# Patient Record
Sex: FEMALE | Race: WHITE | Employment: OTHER | URBAN - METROPOLITAN AREA
[De-identification: names, ages, dates, MRNs, and addresses within clinical notes are randomized per-mention and may not be internally consistent; named-entity substitution may affect disease eponyms.]

---

## 2017-04-16 ENCOUNTER — HOSPITAL ENCOUNTER (EMERGENCY)
Facility: HOSPITAL | Age: 57
Discharge: HOME | End: 2017-04-16
Attending: EMERGENCY MEDICINE
Payer: COMMERCIAL

## 2017-04-16 VITALS
TEMPERATURE: 99 F | SYSTOLIC BLOOD PRESSURE: 130 MMHG | OXYGEN SATURATION: 98 % | DIASTOLIC BLOOD PRESSURE: 88 MMHG | BODY MASS INDEX: 31.01 KG/M2 | HEART RATE: 78 BPM | RESPIRATION RATE: 14 BRPM | HEIGHT: 63 IN | WEIGHT: 175 LBS

## 2017-04-16 DIAGNOSIS — S61.219A FINGER LACERATION, INITIAL ENCOUNTER: Primary | ICD-10-CM

## 2017-04-16 PROCEDURE — 12001 RPR S/N/AX/GEN/TRNK 2.5CM/<: CPT

## 2017-04-16 PROCEDURE — 99282 EMERGENCY DEPT VISIT SF MDM: CPT

## 2017-04-16 PROCEDURE — 99283 EMERGENCY DEPT VISIT LOW MDM: CPT

## 2017-04-16 NOTE — ED PROVIDER NOTES
Patient Seen in: [facility] Emergency Department    History   Patient presents with:  Laceration Abrasion (integumentary)    Stated Complaint: finger lac    HPI    38-year-old white female who presents emergency room today for complaint of laceratio afebrile  Patient has a small 3 mm laceration on the extensor surface of the small finger of her left hand just distal to the MCP joint. Patient can completely flex and extend at the MCP PIP and DIP joint without difficulty.   She is neurovascular intact t

## 2018-04-23 ENCOUNTER — APPOINTMENT (OUTPATIENT)
Dept: CT IMAGING | Facility: HOSPITAL | Age: 58
End: 2018-04-23
Attending: FAMILY MEDICINE
Payer: COMMERCIAL

## 2018-04-23 ENCOUNTER — HOSPITAL ENCOUNTER (OUTPATIENT)
Age: 58
Discharge: HOME | End: 2018-04-23
Attending: FAMILY MEDICINE
Payer: COMMERCIAL

## 2018-04-23 VITALS
HEART RATE: 98 BPM | DIASTOLIC BLOOD PRESSURE: 89 MMHG | TEMPERATURE: 98 F | RESPIRATION RATE: 16 BRPM | HEIGHT: 63 IN | BODY MASS INDEX: 31.18 KG/M2 | WEIGHT: 176 LBS | OXYGEN SATURATION: 97 % | SYSTOLIC BLOOD PRESSURE: 124 MMHG

## 2018-04-23 DIAGNOSIS — R19.7 DIARRHEA, UNSPECIFIED TYPE: ICD-10-CM

## 2018-04-23 DIAGNOSIS — K57.32 SIGMOID DIVERTICULITIS: Primary | ICD-10-CM

## 2018-04-23 DIAGNOSIS — R10.30 LOWER ABDOMINAL PAIN: ICD-10-CM

## 2018-04-23 PROCEDURE — 74177 CT ABD & PELVIS W/CONTRAST: CPT | Performed by: FAMILY MEDICINE

## 2018-04-23 PROCEDURE — 36415 COLL VENOUS BLD VENIPUNCTURE: CPT

## 2018-04-23 PROCEDURE — 81002 URINALYSIS NONAUTO W/O SCOPE: CPT | Performed by: FAMILY MEDICINE

## 2018-04-23 PROCEDURE — 80047 BASIC METABLC PNL IONIZED CA: CPT

## 2018-04-23 PROCEDURE — 99204 OFFICE O/P NEW MOD 45 MIN: CPT

## 2018-04-23 PROCEDURE — 99214 OFFICE O/P EST MOD 30 MIN: CPT

## 2018-04-23 PROCEDURE — 85025 COMPLETE CBC W/AUTO DIFF WBC: CPT | Performed by: FAMILY MEDICINE

## 2018-04-23 RX ORDER — CIPROFLOXACIN 500 MG/1
500 TABLET, FILM COATED ORAL 2 TIMES DAILY
Qty: 20 TABLET | Refills: 0 | Status: SHIPPED | OUTPATIENT
Start: 2018-04-23 | End: 2018-05-03

## 2018-04-23 RX ORDER — METRONIDAZOLE 500 MG/1
500 TABLET ORAL 3 TIMES DAILY
Qty: 30 TABLET | Refills: 0 | Status: SHIPPED | OUTPATIENT
Start: 2018-04-23 | End: 2018-05-03

## 2018-04-23 RX ORDER — MULTIVITAMIN
TABLET ORAL DAILY
COMMUNITY

## 2018-04-23 NOTE — ED PROVIDER NOTES
Patient Seen in: [facility]    History   Patient presents with:  Abdominal Pain  Change of Bowel Habits    Stated Complaint: abdominal pain x4 days    HPI    47-year-old female with a prior history of diverticulitis present for stated complaint: abdominal pain x4 days  Other systems are as noted in HPI. Constitutional and vital signs reviewed. All other systems reviewed and negative except as noted above.     Physical Exam   ED Triage Vitals [04/23/18 1348]  BP: 126/91 ISTAT Chloride 99 (*)     ISTAT Creatinine 1.10 (*)     ISTAT Glucose 101 (*)     All other components within normal limits     Ct Abdomen+pelvis(contrast Only)(cpt=74177)    Result Date: 4/23/2018  PROCEDURE:  CT ABDOMEN+PELVIS (CONTRAST ONLY) (CPT=74177) Nodular contour of the uterus which likely contains underlying fibroids. BONES:  Posttreatment changes of vertebral augmentation noted within lumbar spine. LUNG BASES:  No visible pulmonary or pleural disease. OTHER:  Negative.        CONCLUSION:  There ar

## 2018-04-23 NOTE — ED INITIAL ASSESSMENT (HPI)
The patient states over the last 4-6 days she has had low abdominal pain and a change in bowel movements. She states the lower abdomen is constantly tender but the pain gets worse with certain activities and with bowel movements.   She has had a change in

## 2019-08-04 ENCOUNTER — HOSPITAL ENCOUNTER (OUTPATIENT)
Age: 59
Discharge: HOME | End: 2019-08-04
Attending: FAMILY MEDICINE
Payer: COMMERCIAL

## 2019-08-04 ENCOUNTER — APPOINTMENT (OUTPATIENT)
Dept: GENERAL RADIOLOGY | Age: 59
End: 2019-08-04
Attending: FAMILY MEDICINE
Payer: COMMERCIAL

## 2019-08-04 VITALS
HEART RATE: 101 BPM | TEMPERATURE: 98 F | BODY MASS INDEX: 33.13 KG/M2 | DIASTOLIC BLOOD PRESSURE: 90 MMHG | OXYGEN SATURATION: 95 % | SYSTOLIC BLOOD PRESSURE: 112 MMHG | WEIGHT: 180 LBS | RESPIRATION RATE: 16 BRPM | HEIGHT: 62 IN

## 2019-08-04 DIAGNOSIS — H61.23 BILATERAL IMPACTED CERUMEN: ICD-10-CM

## 2019-08-04 DIAGNOSIS — J01.40 ACUTE NON-RECURRENT PANSINUSITIS: Primary | ICD-10-CM

## 2019-08-04 PROCEDURE — 99214 OFFICE O/P EST MOD 30 MIN: CPT

## 2019-08-04 PROCEDURE — 71046 X-RAY EXAM CHEST 2 VIEWS: CPT | Performed by: FAMILY MEDICINE

## 2019-08-04 PROCEDURE — 99213 OFFICE O/P EST LOW 20 MIN: CPT

## 2019-08-04 RX ORDER — AMOXICILLIN AND CLAVULANATE POTASSIUM 875; 125 MG/1; MG/1
1 TABLET, FILM COATED ORAL 2 TIMES DAILY
Qty: 14 TABLET | Refills: 0 | Status: SHIPPED | OUTPATIENT
Start: 2019-08-04 | End: 2019-08-11

## 2019-08-04 RX ORDER — FLUTICASONE PROPIONATE 50 MCG
2 SPRAY, SUSPENSION (ML) NASAL DAILY
Qty: 16 G | Refills: 0 | Status: SHIPPED | OUTPATIENT
Start: 2019-08-04 | End: 2019-09-03

## 2019-08-04 RX ORDER — BENZONATATE 100 MG/1
100 CAPSULE ORAL 3 TIMES DAILY PRN
Qty: 30 CAPSULE | Refills: 0 | Status: SHIPPED | OUTPATIENT
Start: 2019-08-04 | End: 2019-09-03

## 2019-08-04 RX ORDER — ATORVASTATIN CALCIUM 20 MG/1
20 TABLET, FILM COATED ORAL NIGHTLY
COMMUNITY
End: 2021-09-17

## 2019-08-04 NOTE — ED PROVIDER NOTES
Patient Seen in: [facility]    History   Patient presents with:  Cough/URI    Stated Complaint: upper respitory  x 3 weeks     HPI    64-year-old female with a history of hypertension, diverticulosis, and multiple sinus clair °C) (Temporal)   Resp 16   Ht 157.5 cm (5' 2\")   Wt 81.6 kg   SpO2 95%   BMI 32.92 kg/m²         Physical Exam    Gen: Pleasant female in NAD. Accompanied by   Head: Normocephalic atraumatic.   Lateral frontal, maxillary, and ethmoid sinus tenderne Oral Cap  Take 1 capsule (100 mg total) by mouth 3 (three) times daily as needed for cough., Normal, Disp-30 capsule, R-0    Fluticasone Propionate 50 MCG/ACT Nasal Suspension  2 sprays by Nasal route daily. , Normal, Disp-16 g, R-0

## 2019-08-04 NOTE — ED INITIAL ASSESSMENT (HPI)
C/o cold symptoms for 3 weeks, but last 2 days feeling run down, chills. Feeling chest and sinus congestion. Using Mucinex. Had some dizziness with illness, but none now.

## 2020-12-04 ENCOUNTER — APPOINTMENT (OUTPATIENT)
Dept: CT IMAGING | Facility: HOSPITAL | Age: 60
End: 2020-12-04
Attending: EMERGENCY MEDICINE
Payer: COMMERCIAL

## 2020-12-04 ENCOUNTER — HOSPITAL ENCOUNTER (EMERGENCY)
Facility: HOSPITAL | Age: 60
Discharge: HOME | End: 2020-12-04
Attending: EMERGENCY MEDICINE
Payer: COMMERCIAL

## 2020-12-04 ENCOUNTER — HOSPITAL ENCOUNTER (OUTPATIENT)
Age: 60
Discharge: ANOTHER HOSPITAL | End: 2020-12-04
Payer: COMMERCIAL

## 2020-12-04 VITALS
TEMPERATURE: 99 F | HEIGHT: 63 IN | DIASTOLIC BLOOD PRESSURE: 90 MMHG | RESPIRATION RATE: 18 BRPM | HEART RATE: 90 BPM | WEIGHT: 180 LBS | BODY MASS INDEX: 31.89 KG/M2 | SYSTOLIC BLOOD PRESSURE: 134 MMHG | OXYGEN SATURATION: 99 %

## 2020-12-04 VITALS
DIASTOLIC BLOOD PRESSURE: 88 MMHG | SYSTOLIC BLOOD PRESSURE: 141 MMHG | HEIGHT: 63 IN | RESPIRATION RATE: 18 BRPM | HEART RATE: 118 BPM | WEIGHT: 180 LBS | OXYGEN SATURATION: 95 % | BODY MASS INDEX: 31.89 KG/M2 | TEMPERATURE: 99 F

## 2020-12-04 DIAGNOSIS — R10.9 ABDOMINAL PAIN OF UNKNOWN ETIOLOGY: ICD-10-CM

## 2020-12-04 DIAGNOSIS — R10.9 ABDOMINAL PAIN, ACUTE: Primary | ICD-10-CM

## 2020-12-04 DIAGNOSIS — R11.0 NAUSEA: ICD-10-CM

## 2020-12-04 DIAGNOSIS — K57.92 ACUTE DIVERTICULITIS: Primary | ICD-10-CM

## 2020-12-04 PROCEDURE — 81003 URINALYSIS AUTO W/O SCOPE: CPT | Performed by: EMERGENCY MEDICINE

## 2020-12-04 PROCEDURE — 99215 OFFICE O/P EST HI 40 MIN: CPT | Performed by: NURSE PRACTITIONER

## 2020-12-04 PROCEDURE — 85025 COMPLETE CBC W/AUTO DIFF WBC: CPT | Performed by: EMERGENCY MEDICINE

## 2020-12-04 PROCEDURE — 96365 THER/PROPH/DIAG IV INF INIT: CPT

## 2020-12-04 PROCEDURE — 80053 COMPREHEN METABOLIC PANEL: CPT | Performed by: EMERGENCY MEDICINE

## 2020-12-04 PROCEDURE — 99285 EMERGENCY DEPT VISIT HI MDM: CPT

## 2020-12-04 PROCEDURE — 74176 CT ABD & PELVIS W/O CONTRAST: CPT | Performed by: EMERGENCY MEDICINE

## 2020-12-04 PROCEDURE — 99284 EMERGENCY DEPT VISIT MOD MDM: CPT

## 2020-12-04 PROCEDURE — 83690 ASSAY OF LIPASE: CPT | Performed by: EMERGENCY MEDICINE

## 2020-12-04 PROCEDURE — 96361 HYDRATE IV INFUSION ADD-ON: CPT

## 2020-12-04 RX ORDER — LEVOCETIRIZINE DIHYDROCHLORIDE 5 MG/1
5 TABLET, FILM COATED ORAL EVERY EVENING
COMMUNITY

## 2020-12-04 RX ORDER — LEVOFLOXACIN 500 MG/1
500 TABLET, FILM COATED ORAL DAILY
Qty: 10 TABLET | Refills: 0 | Status: SHIPPED | OUTPATIENT
Start: 2020-12-04 | End: 2020-12-14

## 2020-12-04 RX ORDER — METRONIDAZOLE 500 MG/1
500 TABLET ORAL 3 TIMES DAILY
Qty: 30 TABLET | Refills: 0 | Status: SHIPPED | OUTPATIENT
Start: 2020-12-04 | End: 2020-12-14

## 2020-12-04 RX ORDER — ONDANSETRON 4 MG/1
4 TABLET, ORALLY DISINTEGRATING ORAL EVERY 4 HOURS PRN
Qty: 10 TABLET | Refills: 0 | Status: SHIPPED | OUTPATIENT
Start: 2020-12-04 | End: 2020-12-11

## 2020-12-04 NOTE — ED INITIAL ASSESSMENT (HPI)
Pt c/o RLQ abd pain, Fever last night as high as 102.6 F, constipation and last BM x 2 days ago. Denies vomiting but does have nausea. Pt states she has had some mucous come out when trying to have a BM.

## 2020-12-04 NOTE — ED INITIAL ASSESSMENT (HPI)
Pt reports felt like Wednesday had some constipation and then states\"almost like an incarcerated bowel with mucous coming around the stool\". Having diffuse abdominal pain with intermittent stabbing to RLQ, fever of 102.6 tmax last night.        Hx of dive

## 2020-12-04 NOTE — ED PROVIDER NOTES
Patient Seen in: [facility] Emergency Department      History   Patient presents with:  Abdomen/Flank Pain  Fever    Stated Complaint: abd pain fever    HPI    The patient states that that on Wednesday she had some constipation and felt like she had respiratory distress. The patient is not septic or toxic    HEENT: Atraumatic, conjunctiva are not pale. There is no icterus. Oral mucosa Is wet. No facial trauma. The neck is supple. LUNGS: Clear to auscultation, there is no wheezing or retraction. was drawn.         [facility]      Ct Abdomen+pelvis(cpt=74176)    Result Date: 12/4/2020  PROCEDURE:  CT ABDOMEN+PELVIS (DZO=98698)  COMPARISON:  [facility] , CT, CT ABDOMEN+PELVIS(CONTRAST ONLY)(CPT=74177), 4/23/2018, 3:46 PM.  INDICATIONS:  abd pain fever  TECHNIQU care physician I discussed any point she can always return here if she has increasing pain or discomfort. She did not even want anything for pain even here and she felt comfortable going home. She does not look ill or toxic at this present time.

## 2020-12-04 NOTE — ED PROVIDER NOTES
Patient Seen in: [facility] [address] [place] [address]      History   Patient presents with:  Abdominal Pain    Stated Complaint: FEVER / ABDOMINAL DISCOMFORT    56-year-old female with a history of diverticulitis presents to immediate care with abdominal pain Current:/88   Pulse 118   Temp 98.8 °F (37.1 °C) (Temporal)   Resp 18   Ht 160 cm (5' 3\")   Wt 81.6 kg   SpO2 95%   BMI 31.89 kg/m²         Physical Exam  Vitals signs and nursing note reviewed.    Constitutional:       Appearance: Normal appea

## 2020-12-04 NOTE — ED NOTES
Pt ambulated to BR w/ steady gait. UA collected and sent to lab.  Awaiting lab results for CT and then further POC after results

## 2021-09-17 ENCOUNTER — OFFICE VISIT (OUTPATIENT)
Dept: INTERNAL MEDICINE CLINIC | Facility: CLINIC | Age: 61
End: 2021-09-17
Payer: COMMERCIAL

## 2021-09-17 ENCOUNTER — HOSPITAL ENCOUNTER (OUTPATIENT)
Dept: GENERAL RADIOLOGY | Age: 61
Discharge: HOME | End: 2021-09-17
Attending: NURSE PRACTITIONER
Payer: COMMERCIAL

## 2021-09-17 ENCOUNTER — LAB ENCOUNTER (OUTPATIENT)
Dept: LAB | Age: 61
End: 2021-09-17
Attending: NURSE PRACTITIONER
Payer: COMMERCIAL

## 2021-09-17 VITALS
TEMPERATURE: 99 F | HEIGHT: 63 IN | RESPIRATION RATE: 16 BRPM | OXYGEN SATURATION: 98 % | WEIGHT: 186 LBS | BODY MASS INDEX: 32.96 KG/M2 | DIASTOLIC BLOOD PRESSURE: 70 MMHG | HEART RATE: 94 BPM | SYSTOLIC BLOOD PRESSURE: 118 MMHG

## 2021-09-17 DIAGNOSIS — E78.2 MIXED HYPERLIPIDEMIA: ICD-10-CM

## 2021-09-17 DIAGNOSIS — Z00.00 LABORATORY EXAMINATION ORDERED AS PART OF A ROUTINE GENERAL MEDICAL EXAMINATION: ICD-10-CM

## 2021-09-17 DIAGNOSIS — I10 ESSENTIAL HYPERTENSION, BENIGN: ICD-10-CM

## 2021-09-17 DIAGNOSIS — M54.9 MID BACK PAIN, CHRONIC: ICD-10-CM

## 2021-09-17 DIAGNOSIS — G89.29 MID BACK PAIN, CHRONIC: ICD-10-CM

## 2021-09-17 DIAGNOSIS — Z12.31 ENCOUNTER FOR SCREENING MAMMOGRAM FOR MALIGNANT NEOPLASM OF BREAST: ICD-10-CM

## 2021-09-17 DIAGNOSIS — Z00.00 ANNUAL PHYSICAL EXAM: Primary | ICD-10-CM

## 2021-09-17 DIAGNOSIS — Z12.11 COLON CANCER SCREENING: ICD-10-CM

## 2021-09-17 DIAGNOSIS — Z23 NEED FOR INFLUENZA VACCINATION: ICD-10-CM

## 2021-09-17 DIAGNOSIS — Z87.440 HISTORY OF RECURRENT UTIS: ICD-10-CM

## 2021-09-17 LAB
ALBUMIN SERPL-MCNC: 4.1 G/DL (ref 3.4–5)
ALBUMIN/GLOB SERPL: 1.1 {RATIO} (ref 1–2)
ALP LIVER SERPL-CCNC: 89 U/L
ALT SERPL-CCNC: 54 U/L
ANION GAP SERPL CALC-SCNC: 7 MMOL/L (ref 0–18)
AST SERPL-CCNC: 31 U/L (ref 15–37)
BASOPHILS # BLD AUTO: 0.06 X10(3) UL (ref 0–0.2)
BASOPHILS NFR BLD AUTO: 0.9 %
BILIRUB SERPL-MCNC: 0.9 MG/DL (ref 0.1–2)
BILIRUB UR QL STRIP.AUTO: NEGATIVE
BUN BLD-MCNC: 13 MG/DL (ref 7–18)
CALCIUM BLD-MCNC: 9.4 MG/DL (ref 8.5–10.1)
CHLORIDE SERPL-SCNC: 105 MMOL/L (ref 98–112)
CHOLEST SERPL-MCNC: 168 MG/DL (ref ?–200)
CLARITY UR REFRACT.AUTO: CLEAR
CO2 SERPL-SCNC: 26 MMOL/L (ref 21–32)
CREAT BLD-MCNC: 1.01 MG/DL
EOSINOPHIL # BLD AUTO: 0 X10(3) UL (ref 0–0.7)
EOSINOPHIL NFR BLD AUTO: 0 %
ERYTHROCYTE [DISTWIDTH] IN BLOOD BY AUTOMATED COUNT: 12.9 %
EST. AVERAGE GLUCOSE BLD GHB EST-MCNC: 114 MG/DL (ref 68–126)
GLOBULIN PLAS-MCNC: 3.7 G/DL (ref 2.8–4.4)
GLUCOSE BLD-MCNC: 78 MG/DL (ref 70–99)
GLUCOSE UR STRIP.AUTO-MCNC: NEGATIVE MG/DL
HBA1C MFR BLD HPLC: 5.6 % (ref ?–5.7)
HCT VFR BLD AUTO: 45.2 %
HDLC SERPL-MCNC: 65 MG/DL (ref 40–59)
HGB BLD-MCNC: 14.8 G/DL
IMM GRANULOCYTES # BLD AUTO: 0.01 X10(3) UL (ref 0–1)
IMM GRANULOCYTES NFR BLD: 0.2 %
KETONES UR STRIP.AUTO-MCNC: NEGATIVE MG/DL
LDLC SERPL CALC-MCNC: 84 MG/DL (ref ?–100)
LEUKOCYTE ESTERASE UR QL STRIP.AUTO: NEGATIVE
LYMPHOCYTES # BLD AUTO: 1.92 X10(3) UL (ref 1–4)
LYMPHOCYTES NFR BLD AUTO: 29.4 %
MCH RBC QN AUTO: 31.6 PG (ref 26–34)
MCHC RBC AUTO-ENTMCNC: 32.7 G/DL (ref 31–37)
MCV RBC AUTO: 96.6 FL
MONOCYTES # BLD AUTO: 0.47 X10(3) UL (ref 0.1–1)
MONOCYTES NFR BLD AUTO: 7.2 %
NEUTROPHILS # BLD AUTO: 4.08 X10 (3) UL (ref 1.5–7.7)
NEUTROPHILS # BLD AUTO: 4.08 X10(3) UL (ref 1.5–7.7)
NEUTROPHILS NFR BLD AUTO: 62.3 %
NITRITE UR QL STRIP.AUTO: NEGATIVE
NONHDLC SERPL-MCNC: 103 MG/DL (ref ?–130)
OSMOLALITY SERPL CALC.SUM OF ELEC: 285 MOSM/KG (ref 275–295)
PATIENT FASTING Y/N/NP: YES
PATIENT FASTING Y/N/NP: YES
PH UR STRIP.AUTO: 6 [PH] (ref 5–8)
PLATELET # BLD AUTO: 235 10(3)UL (ref 150–450)
POTASSIUM SERPL-SCNC: 4.4 MMOL/L (ref 3.5–5.1)
PROT SERPL-MCNC: 7.8 G/DL (ref 6.4–8.2)
PROT UR STRIP.AUTO-MCNC: NEGATIVE MG/DL
RBC # BLD AUTO: 4.68 X10(6)UL
RBC UR QL AUTO: NEGATIVE
SODIUM SERPL-SCNC: 138 MMOL/L (ref 136–145)
SP GR UR STRIP.AUTO: 1 (ref 1–1.03)
TRIGL SERPL-MCNC: 104 MG/DL (ref 30–149)
TSI SER-ACNC: 1.49 MIU/ML (ref 0.36–3.74)
UROBILINOGEN UR STRIP.AUTO-MCNC: <2 MG/DL
VIT D+METAB SERPL-MCNC: 32.4 NG/ML (ref 30–100)
VLDLC SERPL CALC-MCNC: 16 MG/DL (ref 0–30)
WBC # BLD AUTO: 6.5 X10(3) UL (ref 4–11)

## 2021-09-17 PROCEDURE — 81003 URINALYSIS AUTO W/O SCOPE: CPT | Performed by: NURSE PRACTITIONER

## 2021-09-17 PROCEDURE — 90471 IMMUNIZATION ADMIN: CPT | Performed by: NURSE PRACTITIONER

## 2021-09-17 PROCEDURE — 3074F SYST BP LT 130 MM HG: CPT | Performed by: NURSE PRACTITIONER

## 2021-09-17 PROCEDURE — 99396 PREV VISIT EST AGE 40-64: CPT | Performed by: NURSE PRACTITIONER

## 2021-09-17 PROCEDURE — 3008F BODY MASS INDEX DOCD: CPT | Performed by: NURSE PRACTITIONER

## 2021-09-17 PROCEDURE — 3078F DIAST BP <80 MM HG: CPT | Performed by: NURSE PRACTITIONER

## 2021-09-17 PROCEDURE — 82306 VITAMIN D 25 HYDROXY: CPT | Performed by: NURSE PRACTITIONER

## 2021-09-17 PROCEDURE — 72072 X-RAY EXAM THORAC SPINE 3VWS: CPT | Performed by: NURSE PRACTITIONER

## 2021-09-17 PROCEDURE — 80061 LIPID PANEL: CPT | Performed by: NURSE PRACTITIONER

## 2021-09-17 PROCEDURE — 90686 IIV4 VACC NO PRSV 0.5 ML IM: CPT | Performed by: NURSE PRACTITIONER

## 2021-09-17 PROCEDURE — 83036 HEMOGLOBIN GLYCOSYLATED A1C: CPT | Performed by: NURSE PRACTITIONER

## 2021-09-17 PROCEDURE — 80050 GENERAL HEALTH PANEL: CPT | Performed by: NURSE PRACTITIONER

## 2021-09-17 RX ORDER — ATORVASTATIN CALCIUM 20 MG/1
20 TABLET, FILM COATED ORAL NIGHTLY
Qty: 90 TABLET | Refills: 3 | Status: SHIPPED | OUTPATIENT
Start: 2021-09-17

## 2021-09-17 RX ORDER — LISINOPRIL 10 MG/1
10 TABLET ORAL DAILY
Qty: 90 TABLET | Refills: 3 | Status: SHIPPED | OUTPATIENT
Start: 2021-09-17

## 2021-09-17 RX ORDER — SULFAMETHOXAZOLE AND TRIMETHOPRIM 400; 80 MG/1; MG/1
1 TABLET ORAL DAILY PRN
Qty: 30 TABLET | Refills: 0 | Status: SHIPPED | OUTPATIENT
Start: 2021-09-17

## 2021-09-17 RX ORDER — PHENOL 1.4 %
AEROSOL, SPRAY (ML) MUCOUS MEMBRANE
COMMUNITY

## 2021-09-17 NOTE — PROGRESS NOTES
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 She is coping well. Strong father history of breast cancer.  She is do for colonoscopy, hx of divertic Multiple Vitamins-Minerals (HAIR SKIN AND NAILS FORMULA OR), , Disp: , Rfl:     No current facility-administered medications on file prior to visit. Review of Systems   Constitutional: Negative for fever, chills and fatigue.    HENT: Negative for hea Lymphadenopathy: She has no cervical, supraclavicular, or axillary adenopathy. : deferred  Neurological: She is alert and oriented to person, place, and time. DTRs are +2 and symmetric. Cranial nerves grossly intact. Skin: Skin is warm.  No rash note Expiration Date: 9/12/2022      Discussed use of sunscreen, wearing seatbelt, recommend regular cardiovascular and weight bearing exercise as well as a well-rounded diet.     Her 5 year prevention plan includes: annual physical and labs, 30 minutes exercise

## 2021-09-17 NOTE — PATIENT INSTRUCTIONS

## 2021-09-20 ENCOUNTER — TELEPHONE (OUTPATIENT)
Dept: INTERNAL MEDICINE CLINIC | Facility: CLINIC | Age: 61
End: 2021-09-20

## 2021-09-20 DIAGNOSIS — Z13.820 OSTEOPOROSIS SCREENING: Primary | ICD-10-CM

## 2021-09-20 NOTE — TELEPHONE ENCOUNTER
----- Message from KVNG Escobedo sent at 9/18/2021  8:30 AM CDT -----  Results reviewed. Please inform patient no DM.  UA, vit d, CBC, TSH, Kidney, liver, and electrolytes WNL  ASCVD risk 4.2%, continue low fat diet with 150 min moderate intensity CV

## 2021-09-20 NOTE — TELEPHONE ENCOUNTER
----- Message from KVNG Armendariz sent at 9/18/2021  8:29 AM CDT -----  Results reviewed. Please inform patient no change since surgery.  No acute fracture but noted to have bone demineralization, recommend dexa scan to check for osteoporosis

## 2021-09-20 NOTE — TELEPHONE ENCOUNTER
Patient expresses understanding of lab/image  results and processes going forward.    Orders placed for f/u dexa

## 2021-10-04 ENCOUNTER — HOSPITAL ENCOUNTER (OUTPATIENT)
Dept: BONE DENSITY | Age: 61
Discharge: HOME | End: 2021-10-04
Attending: NURSE PRACTITIONER
Payer: COMMERCIAL

## 2021-10-04 ENCOUNTER — HOSPITAL ENCOUNTER (OUTPATIENT)
Dept: MAMMOGRAPHY | Age: 61
Discharge: HOME | End: 2021-10-04
Attending: NURSE PRACTITIONER
Payer: COMMERCIAL

## 2021-10-04 DIAGNOSIS — Z13.820 OSTEOPOROSIS SCREENING: ICD-10-CM

## 2021-10-04 DIAGNOSIS — Z12.31 ENCOUNTER FOR SCREENING MAMMOGRAM FOR MALIGNANT NEOPLASM OF BREAST: ICD-10-CM

## 2021-10-04 PROCEDURE — 77063 BREAST TOMOSYNTHESIS BI: CPT | Performed by: NURSE PRACTITIONER

## 2021-10-04 PROCEDURE — 77080 DXA BONE DENSITY AXIAL: CPT | Performed by: NURSE PRACTITIONER

## 2021-10-04 PROCEDURE — 77067 SCR MAMMO BI INCL CAD: CPT | Performed by: NURSE PRACTITIONER

## 2021-10-11 ENCOUNTER — HOSPITAL ENCOUNTER (OUTPATIENT)
Dept: MAMMOGRAPHY | Facility: HOSPITAL | Age: 61
Discharge: HOME | End: 2021-10-11
Attending: NURSE PRACTITIONER
Payer: COMMERCIAL

## 2021-10-11 DIAGNOSIS — R92.2 INCONCLUSIVE MAMMOGRAM: ICD-10-CM

## 2021-10-11 PROCEDURE — 77065 DX MAMMO INCL CAD UNI: CPT | Performed by: NURSE PRACTITIONER

## 2021-10-11 PROCEDURE — 77061 BREAST TOMOSYNTHESIS UNI: CPT | Performed by: NURSE PRACTITIONER

## 2021-10-12 ENCOUNTER — TELEPHONE (OUTPATIENT)
Dept: INTERNAL MEDICINE CLINIC | Facility: CLINIC | Age: 61
End: 2021-10-12

## 2021-10-12 DIAGNOSIS — R92.8 ABNORMAL MAMMOGRAM OF RIGHT BREAST: Primary | ICD-10-CM

## 2021-10-12 NOTE — TELEPHONE ENCOUNTER
Spoke with pt regarding results and recommendations. Pt does no feel comfortable waiting 6 months for f/u imaging as her sister has a h/o breast cancer. She would like to f/u with Dr. Kathryn Wade for consult. Ok per Dr. Berta Melissa to refer. Referral placed.  Pt awar

## 2021-10-12 NOTE — TELEPHONE ENCOUNTER
----- Message from Kavon Love MD sent at 10/11/2021  4:39 PM CDT -----  BI-RADS CATEGORY:     DIAGNOSTIC CATEGORY 3--PROBABLY BENIGN FINDING    RECOMMENDATIONS:     SHORT TERM FOLLOW-UP DIAGNOSTIC MAMMOGRAM RIGHT BREAST IN 6 MONTHS

## 2021-10-18 ENCOUNTER — LAB ENCOUNTER (OUTPATIENT)
Dept: LAB | Age: 61
End: 2021-10-18
Attending: INTERNAL MEDICINE
Payer: COMMERCIAL

## 2021-10-18 DIAGNOSIS — Z98.890 HISTORY OF COLONOSCOPY: ICD-10-CM

## 2021-10-21 PROBLEM — D12.0 BENIGN NEOPLASM OF CECUM: Status: ACTIVE | Noted: 2021-10-21

## 2021-10-21 PROBLEM — Z86.010 HX OF ADENOMATOUS COLONIC POLYPS: Status: ACTIVE | Noted: 2021-10-21

## 2021-10-21 PROBLEM — Z86.0101 HX OF ADENOMATOUS COLONIC POLYPS: Status: ACTIVE | Noted: 2021-10-21

## 2021-10-21 PROBLEM — Z86.0100 PERSONAL HISTORY OF COLONIC POLYPS: Status: ACTIVE | Noted: 2021-10-21

## 2021-10-21 PROBLEM — Z86.010 PERSONAL HISTORY OF COLONIC POLYPS: Status: ACTIVE | Noted: 2021-10-21

## 2022-02-04 ENCOUNTER — LAB ENCOUNTER (OUTPATIENT)
Dept: LAB | Age: 62
End: 2022-02-04
Attending: INTERNAL MEDICINE
Payer: COMMERCIAL

## 2022-02-04 ENCOUNTER — PATIENT MESSAGE (OUTPATIENT)
Dept: INTERNAL MEDICINE CLINIC | Facility: CLINIC | Age: 62
End: 2022-02-04

## 2022-02-04 DIAGNOSIS — Z11.52 ENCOUNTER FOR SCREENING FOR COVID-19: ICD-10-CM

## 2022-02-04 NOTE — TELEPHONE ENCOUNTER
From: Gerardo Pierre  To: KVNG Nagy  Sent: 2/4/2022 7:51 AM CST  Subject: Covid test    Soo Louise, yesterday afternoon I developed a mild headache and chills, no other apparent symptoms, my T-max was 101.8. I am fully vaccinated and boosted. I took an at home rapid test and it was negative. I would like to get a PCR test to confirm.  May I please have an order so I can schedule a test? Thank you, Aure Figueroa

## 2022-02-05 LAB — SARS-COV-2 RNA RESP QL NAA+PROBE: NOT DETECTED

## 2022-02-12 LAB — AMB EXT COVID-19 RESULT: NOT DETECTED

## 2022-02-14 ENCOUNTER — OFFICE VISIT (OUTPATIENT)
Dept: INTERNAL MEDICINE CLINIC | Facility: CLINIC | Age: 62
End: 2022-02-14
Payer: COMMERCIAL

## 2022-02-14 VITALS
SYSTOLIC BLOOD PRESSURE: 124 MMHG | OXYGEN SATURATION: 97 % | TEMPERATURE: 98 F | HEART RATE: 76 BPM | RESPIRATION RATE: 16 BRPM | WEIGHT: 185 LBS | BODY MASS INDEX: 32.78 KG/M2 | DIASTOLIC BLOOD PRESSURE: 82 MMHG | HEIGHT: 63 IN

## 2022-02-14 DIAGNOSIS — R19.8 ABDOMINAL GUARDING: ICD-10-CM

## 2022-02-14 DIAGNOSIS — K57.92 ACUTE DIVERTICULITIS: ICD-10-CM

## 2022-02-14 DIAGNOSIS — R10.32 LLQ PAIN: Primary | ICD-10-CM

## 2022-02-14 PROCEDURE — 99214 OFFICE O/P EST MOD 30 MIN: CPT | Performed by: INTERNAL MEDICINE

## 2022-02-14 PROCEDURE — 3074F SYST BP LT 130 MM HG: CPT | Performed by: INTERNAL MEDICINE

## 2022-02-14 PROCEDURE — 3079F DIAST BP 80-89 MM HG: CPT | Performed by: INTERNAL MEDICINE

## 2022-02-14 PROCEDURE — 3008F BODY MASS INDEX DOCD: CPT | Performed by: INTERNAL MEDICINE

## 2022-02-14 RX ORDER — LEVOFLOXACIN 500 MG/1
500 TABLET, FILM COATED ORAL DAILY
Qty: 10 TABLET | Refills: 0 | Status: SHIPPED | OUTPATIENT
Start: 2022-02-14 | End: 2022-02-24

## 2022-02-14 RX ORDER — PHENOL 1.4 %
600 AEROSOL, SPRAY (ML) MUCOUS MEMBRANE 2 TIMES DAILY
COMMUNITY

## 2022-02-14 RX ORDER — METRONIDAZOLE 500 MG/1
500 TABLET ORAL 3 TIMES DAILY
Qty: 30 TABLET | Refills: 0 | Status: SHIPPED | OUTPATIENT
Start: 2022-02-14 | End: 2022-03-28

## 2022-02-14 RX ORDER — ONDANSETRON 4 MG/1
4 TABLET, FILM COATED ORAL EVERY 8 HOURS PRN
Qty: 20 TABLET | Refills: 0 | Status: SHIPPED | OUTPATIENT
Start: 2022-02-14

## 2022-02-14 NOTE — TELEPHONE ENCOUNTER
Patient developed new onset GI pain and cramping of lower abdomen over the weekend with constant lower abdominal tenderness. Patient complains of nausea with no episodes of emesis. Patient has gas but is noticing bowel changes, which are small and have mucus. Patient has history of diverticulitis.  Office visit scheduled this AM for patient to be seen in office for eval.

## 2022-02-17 ENCOUNTER — HOSPITAL ENCOUNTER (OUTPATIENT)
Dept: CT IMAGING | Facility: HOSPITAL | Age: 62
Discharge: HOME | End: 2022-02-17
Attending: INTERNAL MEDICINE
Payer: COMMERCIAL

## 2022-02-17 DIAGNOSIS — R10.32 LLQ PAIN: ICD-10-CM

## 2022-02-17 DIAGNOSIS — R19.8 ABDOMINAL GUARDING: ICD-10-CM

## 2022-02-17 PROCEDURE — 74177 CT ABD & PELVIS W/CONTRAST: CPT | Performed by: INTERNAL MEDICINE

## 2022-02-17 PROCEDURE — 82565 ASSAY OF CREATININE: CPT

## 2022-02-17 RX ORDER — IOHEXOL 350 MG/ML
100 INJECTION, SOLUTION INTRAVENOUS
Status: COMPLETED | OUTPATIENT
Start: 2022-02-17 | End: 2022-02-17

## 2022-02-17 RX ADMIN — IOHEXOL 100 ML: 350 INJECTION, SOLUTION INTRAVENOUS at 16:34:00

## 2022-02-18 LAB — CREAT BLD-MCNC: 1 MG/DL

## 2022-03-14 ENCOUNTER — OFFICE VISIT (OUTPATIENT)
Dept: SURGERY | Facility: CLINIC | Age: 62
End: 2022-03-14
Payer: COMMERCIAL

## 2022-03-14 VITALS — TEMPERATURE: 97 F | HEART RATE: 87 BPM | SYSTOLIC BLOOD PRESSURE: 113 MMHG | DIASTOLIC BLOOD PRESSURE: 80 MMHG

## 2022-03-14 DIAGNOSIS — K57.92 DIVERTICULITIS: Primary | ICD-10-CM

## 2022-03-14 DIAGNOSIS — Z86.010 HX OF ADENOMATOUS COLONIC POLYPS: ICD-10-CM

## 2022-03-14 PROCEDURE — 99245 OFF/OP CONSLTJ NEW/EST HI 55: CPT | Performed by: COLON & RECTAL SURGERY

## 2022-03-14 PROCEDURE — 3074F SYST BP LT 130 MM HG: CPT | Performed by: COLON & RECTAL SURGERY

## 2022-03-14 PROCEDURE — 3079F DIAST BP 80-89 MM HG: CPT | Performed by: COLON & RECTAL SURGERY

## 2022-03-14 RX ORDER — NEOMYCIN SULFATE 500 MG/1
TABLET ORAL
Qty: 6 TABLET | Refills: 0 | Status: SHIPPED | OUTPATIENT
Start: 2022-03-14

## 2022-03-14 RX ORDER — POLYETHYLENE GLYCOL 3350, SODIUM CHLORIDE, SODIUM BICARBONATE, POTASSIUM CHLORIDE 420; 11.2; 5.72; 1.48 G/4L; G/4L; G/4L; G/4L
POWDER, FOR SOLUTION ORAL
Qty: 1 EACH | Refills: 0 | Status: SHIPPED | OUTPATIENT
Start: 2022-03-14

## 2022-03-14 RX ORDER — METRONIDAZOLE 500 MG/1
TABLET ORAL
Qty: 3 TABLET | Refills: 0 | Status: SHIPPED | OUTPATIENT
Start: 2022-03-14

## 2022-03-14 RX ORDER — AMOXICILLIN AND CLAVULANATE POTASSIUM 875; 125 MG/1; MG/1
1 TABLET, FILM COATED ORAL 2 TIMES DAILY
Qty: 20 TABLET | Refills: 0 | Status: SHIPPED | OUTPATIENT
Start: 2022-03-14 | End: 2022-03-24

## 2022-03-14 NOTE — PATIENT INSTRUCTIONS
I am seeing this patient in consultation from the primary care service and the gastroenterology group regarding multiple episodes of diverticulitis. Her last episode began on February 3, 2022. She took 10 days of antibiotics. She has had moderate relief, but not complete relief. Symptoms are still present at today's visit. She has had 4 different documented episodes of diverticulitis on CT scan in the past.    At this most recent episode her greatest symptoms were 5/10. It lasted several days at that level. She describes her pain in the both right and left lower quadrants. It was a combination of cramping, and intermittent stabbing pain. She did have a fever with this episode. She had nausea, but no vomiting. She has had constipation and bloating associated with her diverticulitis. She has no bright red blood per rectum or melena. She has no blood on the toilet paper. She has mucus in the stool, and narrowing of the stool at this time. She feels incomplete evacuation as well after bowel movement. The patient's last colonoscopy was in November 2021. She has both diverticulosis and history of prior polyps. Her endoscopy is being done by Dr. Noel Cheney. The patient has never been diagnosed with Crohn disease, ulcerative colitis, or irritable bowel syndrome. The patient has a family history of colon polyps in her father. No other first-degree or second-degree relatives have either colon cancer, or cancer of the uterus. She has not had any prior abdominal operations. The patient is not on any blood thinners. The patient has never had heart attack, stroke, heart valve replacement, heart murmur, or cardiac arrhythmia. I have personally reviewed the CT scan myself and provided my own interpretation. I have reviewed past CT scans as well. There is a phlegmon and inflammatory process in the sigmoid colon that dips slightly down into the pelvis.   It is immediately adjacent to the left ureter. On the most recent CT scan there is stranding. There is no evidence of free air or free fluid. Please see the radiologist findings for other incidental findings on the CT scan not related to the diverticulitis. Physical exam reveals her abdomen to be soft, tender in the left lower quadrant to deep palpation. No guarding or rebound. No masses. No ascites. No incisions. There are no inguinal or umbilical hernias present. Bowel sounds have normal activity and normal pitch. Liver and spleen are not palpable. She has persistent symptoms despite antibiotic therapy. There appears to be a smoldering diverticulitis at the site identified on CT scan. We will provide her with Augmentin 875 mg twice daily. This patient will ultimately require robotic low anterior resection the rectosigmoid colon for her multiply recurrent diverticulitis. I had a long conversation with the patient and her  regarding all the above. She is scheduled for operation on April 13, 2022, at [facility].    All risks, benefits, complications and alternatives to the proposed procedure(s) were fully discussed with the patient. All questions from the patient were answered in detail. A description of the procedure(s) and possible outcomes was fully discussed. The patient seemed to understand the conversation and its details. Consent for the procedure(s) was confirmed with the patient. During this visit, the Enhanced Recovery after Intestinal Surgery (ERAS) Patient Guide was discussed with Aurea Constantino. She was provided a copy of the guide to review at home, which includes education on the strategy, pre-op, intra-op and what to expect during the hospital stay for elective colorectal cases.

## 2022-04-01 ENCOUNTER — LABORATORY ENCOUNTER (OUTPATIENT)
Dept: LAB | Facility: HOSPITAL | Age: 62
End: 2022-04-01
Attending: COLON & RECTAL SURGERY
Payer: COMMERCIAL

## 2022-04-01 ENCOUNTER — TELEPHONE (OUTPATIENT)
Dept: SURGERY | Facility: CLINIC | Age: 62
End: 2022-04-01

## 2022-04-01 DIAGNOSIS — K57.92 DIVERTICULITIS: ICD-10-CM

## 2022-04-01 LAB
ANION GAP SERPL CALC-SCNC: 5 MMOL/L (ref 0–18)
ATRIAL RATE: 68 BPM
BUN BLD-MCNC: 15 MG/DL (ref 7–18)
CALCIUM BLD-MCNC: 9.8 MG/DL (ref 8.5–10.1)
CHLORIDE SERPL-SCNC: 108 MMOL/L (ref 98–112)
CO2 SERPL-SCNC: 29 MMOL/L (ref 21–32)
CREAT BLD-MCNC: 1.04 MG/DL
ERYTHROCYTE [DISTWIDTH] IN BLOOD BY AUTOMATED COUNT: 13 %
GLUCOSE BLD-MCNC: 86 MG/DL (ref 70–99)
HCT VFR BLD AUTO: 45.8 %
HGB BLD-MCNC: 14.7 G/DL
MCH RBC QN AUTO: 30.5 PG (ref 26–34)
MCHC RBC AUTO-ENTMCNC: 32.1 G/DL (ref 31–37)
MCV RBC AUTO: 95 FL
OSMOLALITY SERPL CALC.SUM OF ELEC: 294 MOSM/KG (ref 275–295)
P AXIS: 40 DEGREES
P-R INTERVAL: 222 MS
PLATELET # BLD AUTO: 274 10(3)UL (ref 150–450)
POTASSIUM SERPL-SCNC: 4.5 MMOL/L (ref 3.5–5.1)
Q-T INTERVAL: 388 MS
QRS DURATION: 80 MS
QTC CALCULATION (BEZET): 412 MS
R AXIS: 48 DEGREES
RBC # BLD AUTO: 4.82 X10(6)UL
SODIUM SERPL-SCNC: 142 MMOL/L (ref 136–145)
T AXIS: 39 DEGREES
VENTRICULAR RATE: 68 BPM
WBC # BLD AUTO: 6.9 X10(3) UL (ref 4–11)

## 2022-04-01 PROCEDURE — 93010 ELECTROCARDIOGRAM REPORT: CPT | Performed by: INTERNAL MEDICINE

## 2022-04-01 PROCEDURE — 36415 COLL VENOUS BLD VENIPUNCTURE: CPT

## 2022-04-01 PROCEDURE — 93005 ELECTROCARDIOGRAM TRACING: CPT

## 2022-04-01 PROCEDURE — 80048 BASIC METABOLIC PNL TOTAL CA: CPT

## 2022-04-01 PROCEDURE — 85027 COMPLETE CBC AUTOMATED: CPT

## 2022-04-04 ENCOUNTER — OFFICE VISIT (OUTPATIENT)
Dept: SURGERY | Facility: CLINIC | Age: 62
End: 2022-04-04
Payer: COMMERCIAL

## 2022-04-04 VITALS
BODY MASS INDEX: 32.78 KG/M2 | WEIGHT: 185 LBS | HEIGHT: 63 IN | DIASTOLIC BLOOD PRESSURE: 88 MMHG | SYSTOLIC BLOOD PRESSURE: 132 MMHG | TEMPERATURE: 98 F | HEART RATE: 76 BPM

## 2022-04-04 DIAGNOSIS — K57.92 DIVERTICULITIS: Primary | ICD-10-CM

## 2022-04-04 PROCEDURE — 3008F BODY MASS INDEX DOCD: CPT | Performed by: COLON & RECTAL SURGERY

## 2022-04-04 PROCEDURE — 3075F SYST BP GE 130 - 139MM HG: CPT | Performed by: COLON & RECTAL SURGERY

## 2022-04-04 PROCEDURE — 3079F DIAST BP 80-89 MM HG: CPT | Performed by: COLON & RECTAL SURGERY

## 2022-04-04 PROCEDURE — 99213 OFFICE O/P EST LOW 20 MIN: CPT | Performed by: COLON & RECTAL SURGERY

## 2022-04-04 NOTE — PATIENT INSTRUCTIONS
This patient resents for further care and treatment of her acute diverticulitis. She is on the surgery schedule for a robotic low anterior resection the rectosigmoid colon. She still has some pain in the lower abdomen. Her pain is actually right greater than left, however her tenderness is left greater than right. The patient states that she has pain in anticipation of her bowel movements. When the stool gets into the left colon, that is when she has the most symptoms. All of her symptoms are much better than the last time I saw her. She is having bowel movements that are soft and irregular. She has no bright red blood per rectum. She has no nausea or vomiting. She has completed her antibiotic therapy without complication. Clinical exam reveals her abdomen be soft, slightly tender to deep palpation in the left lower quadrant. She is minimally tender on the right side, however that is her point of maximal symptoms. Bowel sounds have normal activity normal pitch. There are no incisions on the abdomen. Liver and spleen are not palpable. BMI is 32.77. We had a long conversation regarding the upcoming operation. I also asked her to please call me if her current pain and symptoms advanced to any degree. If she does call back, we will put her on antibiotics up until the day of surgery. All risks, benefits, complications and alternatives to the proposed procedure(s) were fully discussed with the patient. All questions from the patient were answered in detail. A description of the procedure(s) and possible outcomes was fully discussed. The patient seemed to understand the conversation and its details. Consent for the procedure(s) was confirmed with the patient.

## 2022-04-11 ENCOUNTER — LAB ENCOUNTER (OUTPATIENT)
Dept: LAB | Age: 62
End: 2022-04-11
Attending: COLON & RECTAL SURGERY
Payer: COMMERCIAL

## 2022-04-11 DIAGNOSIS — K57.92 DIVERTICULITIS: ICD-10-CM

## 2022-04-12 ENCOUNTER — HOSPITAL ENCOUNTER (OUTPATIENT)
Dept: MAMMOGRAPHY | Facility: HOSPITAL | Age: 62
Discharge: HOME | End: 2022-04-12
Attending: INTERNAL MEDICINE
Payer: COMMERCIAL

## 2022-04-12 DIAGNOSIS — R92.8 ABNORMAL MAMMOGRAM OF RIGHT BREAST: ICD-10-CM

## 2022-04-12 LAB — SARS-COV-2 RNA RESP QL NAA+PROBE: NOT DETECTED

## 2022-04-12 PROCEDURE — 77065 DX MAMMO INCL CAD UNI: CPT | Performed by: INTERNAL MEDICINE

## 2022-04-12 PROCEDURE — 77061 BREAST TOMOSYNTHESIS UNI: CPT | Performed by: INTERNAL MEDICINE

## 2022-04-13 ENCOUNTER — ANESTHESIA EVENT (OUTPATIENT)
Dept: SURGERY | Facility: HOSPITAL | Age: 62
End: 2022-04-13
Payer: COMMERCIAL

## 2022-04-13 ENCOUNTER — ANESTHESIA (OUTPATIENT)
Dept: SURGERY | Facility: HOSPITAL | Age: 62
End: 2022-04-13
Payer: COMMERCIAL

## 2022-04-13 ENCOUNTER — HOSPITAL ENCOUNTER (INPATIENT)
Facility: HOSPITAL | Age: 62
LOS: 4 days | Discharge: HOME | End: 2022-04-17
Attending: COLON & RECTAL SURGERY | Admitting: COLON & RECTAL SURGERY
Payer: COMMERCIAL

## 2022-04-13 DIAGNOSIS — K57.92 DIVERTICULITIS: Primary | ICD-10-CM

## 2022-04-13 PROCEDURE — 0T788DZ DILATION OF BILATERAL URETERS WITH INTRALUMINAL DEVICE, VIA NATURAL OR ARTIFICIAL OPENING ENDOSCOPIC: ICD-10-PCS | Performed by: UROLOGY

## 2022-04-13 PROCEDURE — S0030 INJECTION, METRONIDAZOLE: HCPCS | Performed by: COLON & RECTAL SURGERY

## 2022-04-13 PROCEDURE — 0DTN0ZZ RESECTION OF SIGMOID COLON, OPEN APPROACH: ICD-10-PCS | Performed by: COLON & RECTAL SURGERY

## 2022-04-13 PROCEDURE — 8E0W4CZ ROBOTIC ASSISTED PROCEDURE OF TRUNK REGION, PERCUTANEOUS ENDOSCOPIC APPROACH: ICD-10-PCS | Performed by: COLON & RECTAL SURGERY

## 2022-04-13 PROCEDURE — 4A1685H MONITORING OF LYMPHATIC FLOW USING INDOCYANINE GREEN DYE, VIA NATURAL OR ARTIFICIAL OPENING ENDOSCOPIC: ICD-10-PCS | Performed by: UROLOGY

## 2022-04-13 PROCEDURE — 88307 TISSUE EXAM BY PATHOLOGIST: CPT | Performed by: COLON & RECTAL SURGERY

## 2022-04-13 RX ORDER — HYDROMORPHONE HYDROCHLORIDE 1 MG/ML
INJECTION, SOLUTION INTRAMUSCULAR; INTRAVENOUS; SUBCUTANEOUS
Status: COMPLETED
Start: 2022-04-13 | End: 2022-04-13

## 2022-04-13 RX ORDER — KETOROLAC TROMETHAMINE 30 MG/ML
30 INJECTION, SOLUTION INTRAMUSCULAR; INTRAVENOUS EVERY 8 HOURS
Status: COMPLETED | OUTPATIENT
Start: 2022-04-14 | End: 2022-04-15

## 2022-04-13 RX ORDER — LIDOCAINE HYDROCHLORIDE ANHYDROUS AND DEXTROSE MONOHYDRATE .8; 5 G/100ML; G/100ML
INJECTION, SOLUTION INTRAVENOUS CONTINUOUS PRN
Status: DISCONTINUED | OUTPATIENT
Start: 2022-04-13 | End: 2022-04-13 | Stop reason: SURG

## 2022-04-13 RX ORDER — HYDROMORPHONE HYDROCHLORIDE 1 MG/ML
0.4 INJECTION, SOLUTION INTRAMUSCULAR; INTRAVENOUS; SUBCUTANEOUS EVERY 5 MIN PRN
Status: DISCONTINUED | OUTPATIENT
Start: 2022-04-13 | End: 2022-04-13 | Stop reason: HOSPADM

## 2022-04-13 RX ORDER — MIDAZOLAM HYDROCHLORIDE 1 MG/ML
INJECTION INTRAMUSCULAR; INTRAVENOUS AS NEEDED
Status: DISCONTINUED | OUTPATIENT
Start: 2022-04-13 | End: 2022-04-13 | Stop reason: SURG

## 2022-04-13 RX ORDER — PHENYLEPHRINE HCL 10 MG/ML
VIAL (ML) INJECTION AS NEEDED
Status: DISCONTINUED | OUTPATIENT
Start: 2022-04-13 | End: 2022-04-13 | Stop reason: SURG

## 2022-04-13 RX ORDER — HYDROCODONE BITARTRATE AND ACETAMINOPHEN 5; 325 MG/1; MG/1
1 TABLET ORAL EVERY 6 HOURS PRN
Status: DISCONTINUED | OUTPATIENT
Start: 2022-04-13 | End: 2022-04-15

## 2022-04-13 RX ORDER — HYDROCODONE BITARTRATE AND ACETAMINOPHEN 10; 325 MG/1; MG/1
2 TABLET ORAL AS NEEDED
Status: DISCONTINUED | OUTPATIENT
Start: 2022-04-13 | End: 2022-04-13 | Stop reason: HOSPADM

## 2022-04-13 RX ORDER — ONDANSETRON 2 MG/ML
4 INJECTION INTRAMUSCULAR; INTRAVENOUS EVERY 6 HOURS PRN
Status: DISCONTINUED | OUTPATIENT
Start: 2022-04-13 | End: 2022-04-17

## 2022-04-13 RX ORDER — EPHEDRINE SULFATE 50 MG/ML
INJECTION INTRAVENOUS AS NEEDED
Status: DISCONTINUED | OUTPATIENT
Start: 2022-04-13 | End: 2022-04-13 | Stop reason: SURG

## 2022-04-13 RX ORDER — ACETAMINOPHEN 500 MG
1000 TABLET ORAL ONCE
Status: DISCONTINUED | OUTPATIENT
Start: 2022-04-13 | End: 2022-04-13 | Stop reason: HOSPADM

## 2022-04-13 RX ORDER — ONDANSETRON 2 MG/ML
INJECTION INTRAMUSCULAR; INTRAVENOUS AS NEEDED
Status: DISCONTINUED | OUTPATIENT
Start: 2022-04-13 | End: 2022-04-13 | Stop reason: SURG

## 2022-04-13 RX ORDER — CEFTRIAXONE 2 G/1
INJECTION, POWDER, FOR SOLUTION INTRAMUSCULAR; INTRAVENOUS
Status: DISPENSED
Start: 2022-04-13 | End: 2022-04-13

## 2022-04-13 RX ORDER — MIDAZOLAM HYDROCHLORIDE 1 MG/ML
1 INJECTION INTRAMUSCULAR; INTRAVENOUS EVERY 5 MIN PRN
Status: DISCONTINUED | OUTPATIENT
Start: 2022-04-13 | End: 2022-04-13 | Stop reason: HOSPADM

## 2022-04-13 RX ORDER — HEPARIN SODIUM 5000 [USP'U]/ML
5000 INJECTION, SOLUTION INTRAVENOUS; SUBCUTANEOUS EVERY 8 HOURS SCHEDULED
Status: DISCONTINUED | OUTPATIENT
Start: 2022-04-14 | End: 2022-04-17

## 2022-04-13 RX ORDER — SODIUM CHLORIDE, SODIUM LACTATE, POTASSIUM CHLORIDE, CALCIUM CHLORIDE 600; 310; 30; 20 MG/100ML; MG/100ML; MG/100ML; MG/100ML
INJECTION, SOLUTION INTRAVENOUS CONTINUOUS
Status: DISCONTINUED | OUTPATIENT
Start: 2022-04-13 | End: 2022-04-13 | Stop reason: HOSPADM

## 2022-04-13 RX ORDER — SODIUM CHLORIDE, SODIUM LACTATE, POTASSIUM CHLORIDE, CALCIUM CHLORIDE 600; 310; 30; 20 MG/100ML; MG/100ML; MG/100ML; MG/100ML
INJECTION, SOLUTION INTRAVENOUS CONTINUOUS PRN
Status: DISCONTINUED | OUTPATIENT
Start: 2022-04-13 | End: 2022-04-13 | Stop reason: SURG

## 2022-04-13 RX ORDER — PROCHLORPERAZINE EDISYLATE 5 MG/ML
5 INJECTION INTRAMUSCULAR; INTRAVENOUS EVERY 8 HOURS PRN
Status: DISCONTINUED | OUTPATIENT
Start: 2022-04-13 | End: 2022-04-17

## 2022-04-13 RX ORDER — NALOXONE HYDROCHLORIDE 0.4 MG/ML
80 INJECTION, SOLUTION INTRAMUSCULAR; INTRAVENOUS; SUBCUTANEOUS AS NEEDED
Status: DISCONTINUED | OUTPATIENT
Start: 2022-04-13 | End: 2022-04-13 | Stop reason: HOSPADM

## 2022-04-13 RX ORDER — HYDROCODONE BITARTRATE AND ACETAMINOPHEN 10; 325 MG/1; MG/1
1 TABLET ORAL AS NEEDED
Status: DISCONTINUED | OUTPATIENT
Start: 2022-04-13 | End: 2022-04-13 | Stop reason: HOSPADM

## 2022-04-13 RX ORDER — LISINOPRIL 10 MG/1
10 TABLET ORAL DAILY
Status: DISCONTINUED | OUTPATIENT
Start: 2022-04-13 | End: 2022-04-17

## 2022-04-13 RX ORDER — INDOCYANINE GREEN AND WATER 25 MG
KIT INJECTION AS NEEDED
Status: DISCONTINUED | OUTPATIENT
Start: 2022-04-13 | End: 2022-04-13

## 2022-04-13 RX ORDER — NEOSTIGMINE METHYLSULFATE 1 MG/ML
INJECTION INTRAVENOUS AS NEEDED
Status: DISCONTINUED | OUTPATIENT
Start: 2022-04-13 | End: 2022-04-13 | Stop reason: SURG

## 2022-04-13 RX ORDER — FAMOTIDINE 20 MG/1
20 TABLET, FILM COATED ORAL 2 TIMES DAILY
Status: DISCONTINUED | OUTPATIENT
Start: 2022-04-13 | End: 2022-04-17

## 2022-04-13 RX ORDER — HEPARIN SODIUM 5000 [USP'U]/ML
INJECTION, SOLUTION INTRAVENOUS; SUBCUTANEOUS
Status: DISPENSED
Start: 2022-04-13 | End: 2022-04-13

## 2022-04-13 RX ORDER — ONDANSETRON 2 MG/ML
4 INJECTION INTRAMUSCULAR; INTRAVENOUS AS NEEDED
Status: DISCONTINUED | OUTPATIENT
Start: 2022-04-13 | End: 2022-04-13 | Stop reason: HOSPADM

## 2022-04-13 RX ORDER — ACETAMINOPHEN 500 MG
500 TABLET ORAL EVERY 6 HOURS PRN
Status: DISCONTINUED | OUTPATIENT
Start: 2022-04-13 | End: 2022-04-17

## 2022-04-13 RX ORDER — ACETAMINOPHEN 500 MG
1000 TABLET ORAL EVERY 6 HOURS PRN
Status: DISCONTINUED | OUTPATIENT
Start: 2022-04-13 | End: 2022-04-17

## 2022-04-13 RX ORDER — 0.9 % SODIUM CHLORIDE 0.9 %
INTRAVENOUS SOLUTION INTRAVENOUS
Status: DISPENSED
Start: 2022-04-13 | End: 2022-04-13

## 2022-04-13 RX ORDER — GLYCOPYRROLATE 0.2 MG/ML
INJECTION, SOLUTION INTRAMUSCULAR; INTRAVENOUS AS NEEDED
Status: DISCONTINUED | OUTPATIENT
Start: 2022-04-13 | End: 2022-04-13 | Stop reason: SURG

## 2022-04-13 RX ORDER — ROCURONIUM BROMIDE 10 MG/ML
INJECTION, SOLUTION INTRAVENOUS AS NEEDED
Status: DISCONTINUED | OUTPATIENT
Start: 2022-04-13 | End: 2022-04-13 | Stop reason: SURG

## 2022-04-13 RX ORDER — METRONIDAZOLE 500 MG/100ML
500 INJECTION, SOLUTION INTRAVENOUS ONCE
Status: COMPLETED | OUTPATIENT
Start: 2022-04-13 | End: 2022-04-13

## 2022-04-13 RX ORDER — HEPARIN SODIUM 5000 [USP'U]/ML
5000 INJECTION, SOLUTION INTRAVENOUS; SUBCUTANEOUS ONCE
Status: COMPLETED | OUTPATIENT
Start: 2022-04-13 | End: 2022-04-13

## 2022-04-13 RX ORDER — HYDROMORPHONE HYDROCHLORIDE 1 MG/ML
0.2 INJECTION, SOLUTION INTRAMUSCULAR; INTRAVENOUS; SUBCUTANEOUS EVERY 2 HOUR PRN
Status: DISCONTINUED | OUTPATIENT
Start: 2022-04-13 | End: 2022-04-17

## 2022-04-13 RX ORDER — FAMOTIDINE 10 MG/ML
20 INJECTION, SOLUTION INTRAVENOUS 2 TIMES DAILY
Status: DISCONTINUED | OUTPATIENT
Start: 2022-04-13 | End: 2022-04-14

## 2022-04-13 RX ORDER — MEPERIDINE HYDROCHLORIDE 25 MG/ML
12.5 INJECTION INTRAMUSCULAR; INTRAVENOUS; SUBCUTANEOUS AS NEEDED
Status: DISCONTINUED | OUTPATIENT
Start: 2022-04-13 | End: 2022-04-13 | Stop reason: HOSPADM

## 2022-04-13 RX ORDER — LIDOCAINE HYDROCHLORIDE 10 MG/ML
INJECTION, SOLUTION EPIDURAL; INFILTRATION; INTRACAUDAL; PERINEURAL AS NEEDED
Status: DISCONTINUED | OUTPATIENT
Start: 2022-04-13 | End: 2022-04-13 | Stop reason: SURG

## 2022-04-13 RX ORDER — HYDROMORPHONE HYDROCHLORIDE 1 MG/ML
0.8 INJECTION, SOLUTION INTRAMUSCULAR; INTRAVENOUS; SUBCUTANEOUS EVERY 2 HOUR PRN
Status: DISCONTINUED | OUTPATIENT
Start: 2022-04-13 | End: 2022-04-17

## 2022-04-13 RX ORDER — METOCLOPRAMIDE HYDROCHLORIDE 5 MG/ML
10 INJECTION INTRAMUSCULAR; INTRAVENOUS AS NEEDED
Status: DISCONTINUED | OUTPATIENT
Start: 2022-04-13 | End: 2022-04-13 | Stop reason: HOSPADM

## 2022-04-13 RX ORDER — SODIUM CHLORIDE 9 MG/ML
INJECTION, SOLUTION INTRAVENOUS CONTINUOUS
Status: DISCONTINUED | OUTPATIENT
Start: 2022-04-13 | End: 2022-04-17

## 2022-04-13 RX ORDER — HYDROMORPHONE HYDROCHLORIDE 1 MG/ML
0.4 INJECTION, SOLUTION INTRAMUSCULAR; INTRAVENOUS; SUBCUTANEOUS EVERY 2 HOUR PRN
Status: DISCONTINUED | OUTPATIENT
Start: 2022-04-13 | End: 2022-04-17

## 2022-04-13 RX ADMIN — PHENYLEPHRINE HCL 100 MCG: 10 MG/ML VIAL (ML) INJECTION at 20:47:00

## 2022-04-13 RX ADMIN — PHENYLEPHRINE HCL 100 MCG: 10 MG/ML VIAL (ML) INJECTION at 20:35:00

## 2022-04-13 RX ADMIN — ROCURONIUM BROMIDE 10 MG: 10 INJECTION, SOLUTION INTRAVENOUS at 18:48:00

## 2022-04-13 RX ADMIN — EPHEDRINE SULFATE 10 MG: 50 INJECTION INTRAVENOUS at 19:09:00

## 2022-04-13 RX ADMIN — LIDOCAINE HYDROCHLORIDE 50 MG: 10 INJECTION, SOLUTION EPIDURAL; INFILTRATION; INTRACAUDAL; PERINEURAL at 17:46:00

## 2022-04-13 RX ADMIN — LIDOCAINE HYDROCHLORIDE ANHYDROUS AND DEXTROSE MONOHYDRATE 1.46 MG/KG/HR: .8; 5 INJECTION, SOLUTION INTRAVENOUS at 18:00:00

## 2022-04-13 RX ADMIN — ROCURONIUM BROMIDE 50 MG: 10 INJECTION, SOLUTION INTRAVENOUS at 17:46:00

## 2022-04-13 RX ADMIN — ROCURONIUM BROMIDE 10 MG: 10 INJECTION, SOLUTION INTRAVENOUS at 19:30:00

## 2022-04-13 RX ADMIN — SODIUM CHLORIDE, SODIUM LACTATE, POTASSIUM CHLORIDE, CALCIUM CHLORIDE: 600; 310; 30; 20 INJECTION, SOLUTION INTRAVENOUS at 17:48:00

## 2022-04-13 RX ADMIN — METRONIDAZOLE 500 MG: 500 INJECTION, SOLUTION INTRAVENOUS at 17:48:00

## 2022-04-13 RX ADMIN — PHENYLEPHRINE HCL 100 MCG: 10 MG/ML VIAL (ML) INJECTION at 18:27:00

## 2022-04-13 RX ADMIN — MIDAZOLAM HYDROCHLORIDE 2 MG: 1 INJECTION INTRAMUSCULAR; INTRAVENOUS at 17:46:00

## 2022-04-13 RX ADMIN — GLYCOPYRROLATE 0.4 MG: 0.2 INJECTION, SOLUTION INTRAMUSCULAR; INTRAVENOUS at 21:31:00

## 2022-04-13 RX ADMIN — ROCURONIUM BROMIDE 10 MG: 10 INJECTION, SOLUTION INTRAVENOUS at 20:20:00

## 2022-04-13 RX ADMIN — PHENYLEPHRINE HCL 50 MCG: 10 MG/ML VIAL (ML) INJECTION at 18:39:00

## 2022-04-13 RX ADMIN — ROCURONIUM BROMIDE 10 MG: 10 INJECTION, SOLUTION INTRAVENOUS at 19:43:00

## 2022-04-13 RX ADMIN — ROCURONIUM BROMIDE 10 MG: 10 INJECTION, SOLUTION INTRAVENOUS at 20:35:00

## 2022-04-13 RX ADMIN — ROCURONIUM BROMIDE 10 MG: 10 INJECTION, SOLUTION INTRAVENOUS at 18:34:00

## 2022-04-13 RX ADMIN — ONDANSETRON 4 MG: 2 INJECTION INTRAMUSCULAR; INTRAVENOUS at 21:31:00

## 2022-04-13 RX ADMIN — ROCURONIUM BROMIDE 10 MG: 10 INJECTION, SOLUTION INTRAVENOUS at 20:55:00

## 2022-04-13 RX ADMIN — NEOSTIGMINE METHYLSULFATE 3 MG: 1 INJECTION INTRAVENOUS at 21:31:00

## 2022-04-13 NOTE — OPERATIVE REPORT
[id number] Patient Status:  Inpatient    1960 MRN [id number]   [facility] SURGERY Attending Melody Carroll MD   Hosp Day # 0 PCP Aristeo Hernandez MD     DATE OF OPERATION; 2022    SURGEON: Adonis Phelps M.D. PREOPERATIVE DIAGNOSIS: Diverticulitis, Surgeon Requests Ureteral Catheters    POSTOPERATIVE DIAGNOSIS: Same    PROCEDURE PERFORMED: Cystoscopy, Bilateral Ureteral Catheter Catheter Placement, Bilateral Retrograde ICG Injection    ANESTHESIA:  General.     INDICATIONS: Spoke to patient and . 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 History of postcoital UTIs and PCP gives her postcoital Bactrim. Dr. Emma Beal requested ureteral catheter placement and ICG injection. FINDINGS: Normal bladder and ureteral orifices. EBL: None (Blood administered;None)    COMPLICATIONS: None    TECHNIQUE:  A general anesthesia was induced. A time-out was  reviewed. Preoperative antibiotics were administered. The patient  was prepped and draped in the dorsal lithotomy position. Sequential  compression devices were in place on the lower legs. The cystoscope was passed into the urethra, and the urethra and  bladder were examined. No mucosal lesions. The ureteral orifices were in normal position. The cystoscope was used to pass a guidewire into the right ureteral orifice. A 5 Thai ureteral catheter was advanced over the guidewire, 22 cm into the right ureter. Using a similar technique a ureteral catheter was passed into the left ureteral orifice. The instruments were removed. 4cc ICG was injected bilaterally. An Sanchez Avery catheter was passed into the bladder and then the ureteral catheters were brought in to the side ports and out through the neck of the [place] catheter. Dr. Emma Beal then proceeded with his colon surgery. [address] [facility]  2022

## 2022-04-13 NOTE — ANESTHESIA PROCEDURE NOTES
Airway  Date/Time: 4/13/2022 5:50 PM  Urgency: elective    Airway not difficult    General Information and Staff    Patient location during procedure: OR  Anesthesiologist: Jackie Yeboah MD  Performed: anesthesiologist     Indications and Patient Condition  Indications for airway management: anesthesia  Sedation level: deep  Preoxygenated: yes  Patient position: sniffing  Mask difficulty assessment: 1 - vent by mask  Planned trial extubation    Final Airway Details  Final airway type: endotracheal airway      Successful airway: ETT  Cuffed: yes   Successful intubation technique: direct laryngoscopy  Endotracheal tube insertion site: oral  Blade: Silvino  Blade size: #3  ETT size (mm): 7.0    Cormack-Lehane Classification: grade I - full view of glottis  Placement verified by: chest auscultation and capnometry   Measured from: lips  ETT to lips (cm): 20  Number of attempts at approach: 1

## 2022-04-13 NOTE — INTERVAL H&P NOTE
Pre-op Diagnosis: Diverticulitis [K57.92]    The above referenced H&P was reviewed by Renée Johnson MD on 4/13/2022, the patient was examined and no significant changes have occurred in the patient's condition since the H&P was performed. I discussed with the patient and/or legal representative the potential benefits, risks and side effects of this procedure; the likelihood of the patient achieving goals; and potential problems that might occur during recuperation. I discussed reasonable alternatives to the procedure, including risks, benefits and side effects related to the alternatives and risks related to not receiving this procedure. We will proceed with procedure as planned.

## 2022-04-14 ENCOUNTER — TELEPHONE (OUTPATIENT)
Dept: INTERNAL MEDICINE CLINIC | Facility: CLINIC | Age: 62
End: 2022-04-14

## 2022-04-15 RX ORDER — HYDROCODONE BITARTRATE AND ACETAMINOPHEN 5; 325 MG/1; MG/1
2 TABLET ORAL EVERY 6 HOURS PRN
Status: DISCONTINUED | OUTPATIENT
Start: 2022-04-15 | End: 2022-04-17

## 2022-04-15 NOTE — PLAN OF CARE
Assumed care of pt at [facility]. Pt c/o feeling like increased pressure in abdomen since diaz d/julieta. Pt attempted to void earlier with only a small amount of bloody urine noted. Pt medicated for pain and assisted to bathroom and voided good amount of bloody urine. Pt stated pressure improved. Lapsites and incisions well approximated lauren with s/s no drainage noted. Pt desat with sleep to upper 80's and 2L applied. Pt with bs and passed gas tonight denies nausea at this time. Tolerating clears will advance to full liquid diet. Will continue to monitor.

## 2022-04-15 NOTE — PLAN OF CARE
0930  Pain control  Norco increased to two tabs  Diet to remain at full liquids  Ambulate in min goal of three times today  Blood in urine.  No need for cbc and ok for heparin per dr. Bob Nicole to chair for meals  PERFECTO drain care total 50cc last night    1600  Ambulated twice todAY  Abdomen slightly distended with Hypoactive BS  Passing gas still, and belching  Pain control improved with two norco    1730  Shift PERFECTO drain total 90

## 2022-04-16 RX ORDER — ONDANSETRON 4 MG/1
4 TABLET, FILM COATED ORAL EVERY 4 HOURS PRN
Status: DISCONTINUED | OUTPATIENT
Start: 2022-04-16 | End: 2022-04-17

## 2022-04-16 RX ORDER — HYDROCODONE BITARTRATE AND ACETAMINOPHEN 5; 325 MG/1; MG/1
1 TABLET ORAL EVERY 6 HOURS PRN
Qty: 20 TABLET | Refills: 0 | Status: SHIPPED | OUTPATIENT
Start: 2022-04-16 | End: 2022-04-21

## 2022-04-16 RX ORDER — POLYETHYLENE GLYCOL 3350 17 G/17G
17 POWDER, FOR SOLUTION ORAL 2 TIMES DAILY PRN
Status: DISCONTINUED | OUTPATIENT
Start: 2022-04-16 | End: 2022-04-17

## 2022-04-16 NOTE — PROGRESS NOTES
Patient has no IV access (OK per MD), on room air, voiding well, passing flatus but no BM, ambulates independently, PERFECTO drain x1 with serosanguinous drainage, incisions are C/D/I, tolerating a full liquid diet-advanced to low fiber for dinner, Norco given for pain. Patient updated on plan of care.

## 2022-04-17 VITALS
TEMPERATURE: 99 F | RESPIRATION RATE: 18 BRPM | OXYGEN SATURATION: 96 % | BODY MASS INDEX: 32.07 KG/M2 | DIASTOLIC BLOOD PRESSURE: 72 MMHG | WEIGHT: 181 LBS | SYSTOLIC BLOOD PRESSURE: 128 MMHG | HEART RATE: 64 BPM | HEIGHT: 63 IN

## 2022-04-17 NOTE — PROGRESS NOTES
Assumed care of patient at 2300. Pt is resting comfortably in bed. Denies any pain at this time. Plan of care was reviewed. All questions answered.

## 2022-04-17 NOTE — PLAN OF CARE
Pt discharged home. Discharge instructions given to pt & , including:  rx for norco sent to pt's pharmacy, diet, hydration, hygiene, wound care, nabil drain care & f/u care. Verbalized understanding of all instructions. Left unit stable via w/c.

## 2022-04-18 NOTE — TELEPHONE ENCOUNTER
Spoke with Everton RN with [employer] program  Updated on last annual physical and pap smear  Understanding verbalized, snap shot updated with contact info

## 2022-04-19 ENCOUNTER — TELEPHONE (OUTPATIENT)
Dept: INTERNAL MEDICINE CLINIC | Facility: CLINIC | Age: 62
End: 2022-04-19

## 2022-04-19 ENCOUNTER — PATIENT OUTREACH (OUTPATIENT)
Dept: CASE MANAGEMENT | Age: 62
End: 2022-04-19

## 2022-04-19 NOTE — PROGRESS NOTES
1st attempt General Surgery apt request     [facility] General Surgery  [address] [place] [address],[address]  [phone]  Apt made for Thurs.  April 21st @9:15am    Patient notified

## 2022-04-19 NOTE — TELEPHONE ENCOUNTER
Spoke to pt for TCM today. Pt does not have HFU appt scheduled at this time. Pt is to see Dr. Thelma Hidalgo and plans to see him first. Pt was not sure she needs to be seen by PCP at this time and preferred not to unless she has to. Please FU to see if pt needs to be seen. Pt reports she is doing well and denies concerns. Thank you.

## 2022-04-19 NOTE — PROGRESS NOTES
Pt was advised to FU with Dr. Thelma Hidalgo. Pt stated she was told to see him Thursday. Pt did try to call the office yesterday and is still awaiting a CB, she did leave a message for a CB. Pt is open to the day and time but would like to try for this Thursday as he had requested from her. Thank you!     Pt is to see:     Schedule an appointment with Aris Hernandez MD as soon as  possible for a visit in 1 week(s)  Specialty: Surgery, Colon & Rectal, SURGERY, GENERAL  For wound re-check  [facility] General Surgery  [address] [place] [address],Fourth Floor [address] [phone]

## 2022-04-20 NOTE — DISCHARGE SUMMARY
[facility]  Discharge Summary    [address] [place] [address] [address] Patient Status:  Inpatient    1960 MRN [id number]   [facility] 3NW-A Attending No att. providers found   [facility] Day # 4 PCP Rosy Mejia MD     Date of Admission: 2022    Date of Discharge: 2022    Admitting Diagnosis: Diverticulitis [K57.92]     Patient Active Problem List:     Diverticulitis     Essential hypertension, benign     Mixed hyperlipidemia     Hx of adenomatous colonic polyps      Reason for Admission:  Diverticulitis [K57.92]     Procedures: ROBOTIC LOW ANTERIOR RESECTION OF THE RECTOSIGMOID COLON. CYSTOSCOPY WITH PLACEMENT OF  BILATERAL OPEN ENDED CATHETER PER Dr. Leif Donovan     History of Present Illness: Diverticulitis [K57.92]  Multiply recurrent diverticulitis    Hospital Course: The patient tolerated the above listed procedure without complication. The patient is being discharged with the following physical exam.      Physical Exam: Abdomen soft, non-tender, good bowel sounds. Incisions clean, dry, intact, no erythema. Consultations: None    Complications: none     Disposition: Home to self care   Discharge Condition: Good    Discharge Medications: Discharge Medication List as of 2022  4:52 PM    START taking these medications    HYDROcodone-acetaminophen (NORCO) 5-325 MG Oral Tab  Take 1 tablet by mouth every 6 (six) hours as needed for Pain., Normal, Disp-20 tablet, R-0      CONTINUE these medications which have NOT CHANGED    CHOLECALCIFEROL OR  Take 1 capsule by mouth daily. , Historical    Calcium Carbonate 600 MG Oral Tab  Take 600 mg by mouth 2 (two) times a day., Historical    Melatonin 10 MG Oral Tab  Take 10 mg by mouth nightly as needed., Historical    Multiple Vitamins-Minerals (HAIR SKIN AND NAILS FORMULA OR)  Take 1 tablet by mouth daily. , Historical    lisinopril 10 MG Oral Tab  Take 1 tablet (10 mg total) by mouth daily. , Normal, Disp-90 tablet, R-3    atorvastatin 20 MG Oral Tab  Take 1 tablet (20 mg total) by mouth nightly., Normal, Disp-90 tablet, R-3    Levocetirizine Dihydrochloride 5 MG Oral Tab  Take 5 mg by mouth every evening., Historical    Multiple Vitamin (MULTI-VITAMIN DAILY) Oral Tab  Take by mouth daily. , Historical    ondansetron (ZOFRAN) 4 mg tablet  Take 1 tablet (4 mg total) by mouth every 8 (eight) hours as needed for Nausea., Normal, Disp-20 tablet, R-0    sulfamethoxazole-trimethoprim 400-80 MG Oral Tab  Take 1 tablet by mouth daily as needed. Take post intercourse, Normal, Disp-30 tablet, R-0          Follow up Visits: Follow-up with Jose Rafael Leo in approximately the next 7-10 days.      Other Discharge Instructions:    General diet  No lifting, no driving, the patient may shower  [place] for pain      Jose Rafael Leo MD  4/20/2022  6:21 PM

## 2022-04-21 ENCOUNTER — OFFICE VISIT (OUTPATIENT)
Dept: SURGERY | Facility: CLINIC | Age: 62
End: 2022-04-21

## 2022-04-21 VITALS — HEART RATE: 80 BPM | DIASTOLIC BLOOD PRESSURE: 78 MMHG | SYSTOLIC BLOOD PRESSURE: 108 MMHG | TEMPERATURE: 97 F

## 2022-04-21 DIAGNOSIS — K57.92 DIVERTICULITIS: Primary | ICD-10-CM

## 2022-04-21 PROCEDURE — 3078F DIAST BP <80 MM HG: CPT | Performed by: COLON & RECTAL SURGERY

## 2022-04-21 PROCEDURE — 3074F SYST BP LT 130 MM HG: CPT | Performed by: COLON & RECTAL SURGERY

## 2022-04-21 PROCEDURE — 99024 POSTOP FOLLOW-UP VISIT: CPT | Performed by: COLON & RECTAL SURGERY

## 2022-04-21 NOTE — PATIENT INSTRUCTIONS
This patient underwent a robotic low anterior resection of the rectosigmoid colon for diverticulitis on April 13, 2022. She remains with a Supa drain in place. It is draining minimally. This patient is doing extremely well. The patient has no nausea, vomiting, diarrhea, abdominal pain, or constipation. The patient has no complaints at the operative site. The patient has no incisional complaints. The patient denies fever or chills. Abdominal exam: Soft, nontender, nondistended, good bowel sounds. Incisions: All laparoscopic incisions are clean, dry, intact, without erythema, or cellulitis. The drain is serous material.  I took the opportunity today to remove the drain. I have no further follow-up scheduled with this patient at this time. This patient can see me on an as-needed basis. This patient should return urgently for any problems or complications related to my surgical intervention. I counseled patient on diet going forward, activities in the time of resumption, and all things regarding postoperative care. The should see me urgently for any problems in this postoperative time period.

## 2022-06-13 ENCOUNTER — PATIENT MESSAGE (OUTPATIENT)
Dept: INTERNAL MEDICINE CLINIC | Facility: CLINIC | Age: 62
End: 2022-06-13

## 2022-07-12 ENCOUNTER — MED REC SCAN ONLY (OUTPATIENT)
Dept: INTERNAL MEDICINE CLINIC | Facility: CLINIC | Age: 62
End: 2022-07-12

## 2022-07-15 ENCOUNTER — TELEMEDICINE (OUTPATIENT)
Dept: INTERNAL MEDICINE CLINIC | Facility: CLINIC | Age: 62
End: 2022-07-15

## 2022-07-15 ENCOUNTER — PATIENT MESSAGE (OUTPATIENT)
Dept: INTERNAL MEDICINE CLINIC | Facility: CLINIC | Age: 62
End: 2022-07-15

## 2022-07-15 DIAGNOSIS — U07.1 COVID-19: Primary | ICD-10-CM

## 2022-07-15 PROCEDURE — 99213 OFFICE O/P EST LOW 20 MIN: CPT

## 2022-07-15 RX ORDER — KETOCONAZOLE 20 MG/ML
SHAMPOO TOPICAL
COMMUNITY
Start: 2022-07-09

## 2022-07-15 NOTE — TELEPHONE ENCOUNTER
Confirmed COVID f/u call  Symptom onset: 7/14/22  Positive test date: 7/15/22   Isolation period ends: 7/19/22 if fever free and symptoms have improved  Mask wearing begins:  7/15/22 through 7/22/22  Restrictions end:  7/19/22  Symptoms improved: no  Temperature: no  Cough: yes  Shortness of breath: no  Any other symptoms: post nasal drip  Pulse ox:  n/a  D/w pt isolation period, mask wearing period and when restrictions end. Pt was advised of the following:      Patient want to discuss options for paxlovid since her symptoms are mild   Recommended to see provider via VV to discuss, pt agreed, appt given    - when to get covid booster if applicable  - contact office with worsening non emergent symptoms or further questions  - proceed to ER if any of the following begin:  worsening difficulty breathing/shortness of breath, high fever not controlled by fever reducing agents and pulse ox less than 90% if monitoring. Pt expressed understanding.

## 2022-07-15 NOTE — TELEPHONE ENCOUNTER
From: Jose Adames  To: Abigail Delacruz MD  Sent: 7/15/2022 11:27 AM CDT  Subject: Covid    I tested positive this morning for Covid (home test) after an exposure on Tuesday. My symptoms are mild right now. I have a mild sore throat, mild headache and no fever. What are your thoughts about Paxlovid?  Thanks, [employer]

## 2022-09-11 DIAGNOSIS — I10 ESSENTIAL HYPERTENSION, BENIGN: Primary | ICD-10-CM

## 2022-09-11 DIAGNOSIS — E78.2 MIXED HYPERLIPIDEMIA: ICD-10-CM

## 2022-09-12 RX ORDER — ATORVASTATIN CALCIUM 20 MG/1
TABLET, FILM COATED ORAL
Qty: 90 TABLET | Refills: 3 | Status: SHIPPED | OUTPATIENT
Start: 2022-09-12

## 2022-09-12 RX ORDER — LISINOPRIL 10 MG/1
TABLET ORAL
Qty: 90 TABLET | Refills: 3 | Status: SHIPPED | OUTPATIENT
Start: 2022-09-12

## 2022-09-21 ENCOUNTER — OFFICE VISIT (OUTPATIENT)
Dept: INTERNAL MEDICINE CLINIC | Facility: CLINIC | Age: 62
End: 2022-09-21

## 2022-09-21 ENCOUNTER — LAB ENCOUNTER (OUTPATIENT)
Dept: LAB | Age: 62
End: 2022-09-21
Attending: NURSE PRACTITIONER

## 2022-09-21 VITALS
WEIGHT: 180 LBS | HEART RATE: 80 BPM | HEIGHT: 63 IN | RESPIRATION RATE: 16 BRPM | BODY MASS INDEX: 31.89 KG/M2 | SYSTOLIC BLOOD PRESSURE: 110 MMHG | OXYGEN SATURATION: 97 % | TEMPERATURE: 99 F | DIASTOLIC BLOOD PRESSURE: 82 MMHG

## 2022-09-21 DIAGNOSIS — Z00.00 LABORATORY EXAMINATION ORDERED AS PART OF A ROUTINE GENERAL MEDICAL EXAMINATION: ICD-10-CM

## 2022-09-21 DIAGNOSIS — I44.0 HEART BLOCK AV FIRST DEGREE: ICD-10-CM

## 2022-09-21 DIAGNOSIS — Z00.00 ANNUAL PHYSICAL EXAM: Primary | ICD-10-CM

## 2022-09-21 DIAGNOSIS — Z80.3 FH: BREAST CANCER IN FIRST DEGREE RELATIVE: ICD-10-CM

## 2022-09-21 DIAGNOSIS — R10.11 RUQ PAIN: ICD-10-CM

## 2022-09-21 DIAGNOSIS — R92.8 ABNORMAL MAMMOGRAM: ICD-10-CM

## 2022-09-21 LAB
ALBUMIN SERPL-MCNC: 3.9 G/DL (ref 3.4–5)
ALBUMIN/GLOB SERPL: 1.1 {RATIO} (ref 1–2)
ALP LIVER SERPL-CCNC: 78 U/L
ALT SERPL-CCNC: 45 U/L
ANION GAP SERPL CALC-SCNC: 6 MMOL/L (ref 0–18)
AST SERPL-CCNC: 28 U/L (ref 15–37)
BASOPHILS # BLD AUTO: 0.06 X10(3) UL (ref 0–0.2)
BASOPHILS NFR BLD AUTO: 0.9 %
BILIRUB SERPL-MCNC: 0.6 MG/DL (ref 0.1–2)
BILIRUB UR QL STRIP.AUTO: NEGATIVE
BUN BLD-MCNC: 24 MG/DL (ref 7–18)
CALCIUM BLD-MCNC: 9.7 MG/DL (ref 8.5–10.1)
CHLORIDE SERPL-SCNC: 108 MMOL/L (ref 98–112)
CHOLEST SERPL-MCNC: 164 MG/DL (ref ?–200)
CLARITY UR REFRACT.AUTO: CLEAR
CO2 SERPL-SCNC: 27 MMOL/L (ref 21–32)
COLOR UR AUTO: YELLOW
CREAT BLD-MCNC: 1.05 MG/DL
EOSINOPHIL # BLD AUTO: 0.15 X10(3) UL (ref 0–0.7)
EOSINOPHIL NFR BLD AUTO: 2.4 %
ERYTHROCYTE [DISTWIDTH] IN BLOOD BY AUTOMATED COUNT: 13.4 %
EST. AVERAGE GLUCOSE BLD GHB EST-MCNC: 114 MG/DL (ref 68–126)
FASTING PATIENT LIPID ANSWER: YES
FASTING STATUS PATIENT QL REPORTED: YES
GFR SERPLBLD BASED ON 1.73 SQ M-ARVRAT: 59 ML/MIN/1.73M2 (ref 60–?)
GLOBULIN PLAS-MCNC: 3.5 G/DL (ref 2.8–4.4)
GLUCOSE BLD-MCNC: 96 MG/DL (ref 70–99)
GLUCOSE UR STRIP.AUTO-MCNC: NEGATIVE MG/DL
HBA1C MFR BLD: 5.6 % (ref ?–5.7)
HCT VFR BLD AUTO: 44.5 %
HDLC SERPL-MCNC: 70 MG/DL (ref 40–59)
HGB BLD-MCNC: 14.4 G/DL
HYALINE CASTS #/AREA URNS AUTO: PRESENT /LPF
IMM GRANULOCYTES # BLD AUTO: 0.03 X10(3) UL (ref 0–1)
IMM GRANULOCYTES NFR BLD: 0.5 %
KETONES UR STRIP.AUTO-MCNC: NEGATIVE MG/DL
LDLC SERPL CALC-MCNC: 81 MG/DL (ref ?–100)
LYMPHOCYTES # BLD AUTO: 1.87 X10(3) UL (ref 1–4)
LYMPHOCYTES NFR BLD AUTO: 29.5 %
MCH RBC QN AUTO: 31.4 PG (ref 26–34)
MCHC RBC AUTO-ENTMCNC: 32.4 G/DL (ref 31–37)
MCV RBC AUTO: 96.9 FL
MONOCYTES # BLD AUTO: 0.52 X10(3) UL (ref 0.1–1)
MONOCYTES NFR BLD AUTO: 8.2 %
NEUTROPHILS # BLD AUTO: 3.7 X10 (3) UL (ref 1.5–7.7)
NEUTROPHILS # BLD AUTO: 3.7 X10(3) UL (ref 1.5–7.7)
NEUTROPHILS NFR BLD AUTO: 58.5 %
NITRITE UR QL STRIP.AUTO: NEGATIVE
NONHDLC SERPL-MCNC: 94 MG/DL (ref ?–130)
OSMOLALITY SERPL CALC.SUM OF ELEC: 296 MOSM/KG (ref 275–295)
PH UR STRIP.AUTO: 5.5 [PH] (ref 5–8)
PLATELET # BLD AUTO: 270 10(3)UL (ref 150–450)
POTASSIUM SERPL-SCNC: 4.4 MMOL/L (ref 3.5–5.1)
PROT SERPL-MCNC: 7.4 G/DL (ref 6.4–8.2)
PROT UR STRIP.AUTO-MCNC: NEGATIVE MG/DL
RBC # BLD AUTO: 4.59 X10(6)UL
RBC UR QL AUTO: NEGATIVE
SODIUM SERPL-SCNC: 141 MMOL/L (ref 136–145)
SP GR UR STRIP.AUTO: 1.02 (ref 1–1.03)
TRIGL SERPL-MCNC: 67 MG/DL (ref 30–149)
TSI SER-ACNC: 2.7 MIU/ML (ref 0.36–3.74)
UROBILINOGEN UR STRIP.AUTO-MCNC: 0.2 MG/DL
VLDLC SERPL CALC-MCNC: 10 MG/DL (ref 0–30)
WBC # BLD AUTO: 6.3 X10(3) UL (ref 4–11)

## 2022-09-21 PROCEDURE — 3074F SYST BP LT 130 MM HG: CPT | Performed by: NURSE PRACTITIONER

## 2022-09-21 PROCEDURE — 81001 URINALYSIS AUTO W/SCOPE: CPT | Performed by: NURSE PRACTITIONER

## 2022-09-21 PROCEDURE — 80061 LIPID PANEL: CPT | Performed by: NURSE PRACTITIONER

## 2022-09-21 PROCEDURE — 99396 PREV VISIT EST AGE 40-64: CPT | Performed by: NURSE PRACTITIONER

## 2022-09-21 PROCEDURE — 83036 HEMOGLOBIN GLYCOSYLATED A1C: CPT | Performed by: NURSE PRACTITIONER

## 2022-09-21 PROCEDURE — 3079F DIAST BP 80-89 MM HG: CPT | Performed by: NURSE PRACTITIONER

## 2022-09-21 PROCEDURE — 80050 GENERAL HEALTH PANEL: CPT | Performed by: NURSE PRACTITIONER

## 2022-09-21 PROCEDURE — 3008F BODY MASS INDEX DOCD: CPT | Performed by: NURSE PRACTITIONER

## 2022-09-21 RX ORDER — CHOLECALCIFEROL (VITAMIN D3) 25 MCG
CAPSULE ORAL
COMMUNITY
Start: 2021-09-20

## 2022-09-21 RX ORDER — DIAPER,BRIEF,INFANT-TODD,DISP
EACH MISCELLANEOUS
COMMUNITY
Start: 2021-09-20

## 2022-09-21 RX ORDER — PHENOL 1.4 %
AEROSOL, SPRAY (ML) MUCOUS MEMBRANE
COMMUNITY
Start: 2021-09-20

## 2022-09-27 ENCOUNTER — TELEPHONE (OUTPATIENT)
Dept: INTERNAL MEDICINE CLINIC | Facility: CLINIC | Age: 62
End: 2022-09-27

## 2022-09-27 NOTE — TELEPHONE ENCOUNTER
Pt said she is having an odd feeling and pain in her right knee. She said it feels swollen even though it does not look visibly swollen. Would like to speak to a nurse about her symptoms.

## 2022-09-27 NOTE — TELEPHONE ENCOUNTER
Patient reports yesterday when getting up from her chair she had an \"odd\" sensation behind the L knee cap. Pt states it felt as fluid pushing through and it became pain. Pt reports the pain radiated from the front to the back of knee and today feels sore. Taking Advil prn and using a lidocaine patch. Feels tightness and stiffness. She states it is not red however feels that it is swollen. She states she has a history of a meniscus tear and voices concerns. Appointment scheduled for tomorrow.   Future Appointments   Date Time Provider Alvin Morelos   9/28/2022  1:00 PM Vanessa Montanez PA-C [facility] [address] [address]

## 2022-09-28 ENCOUNTER — OFFICE VISIT (OUTPATIENT)
Dept: INTERNAL MEDICINE CLINIC | Facility: CLINIC | Age: 62
End: 2022-09-28

## 2022-09-28 VITALS
HEART RATE: 82 BPM | RESPIRATION RATE: 16 BRPM | SYSTOLIC BLOOD PRESSURE: 124 MMHG | WEIGHT: 180 LBS | DIASTOLIC BLOOD PRESSURE: 82 MMHG | OXYGEN SATURATION: 97 % | TEMPERATURE: 98 F | HEIGHT: 63 IN | BODY MASS INDEX: 31.89 KG/M2

## 2022-09-28 DIAGNOSIS — M25.561 ACUTE PAIN OF RIGHT KNEE: Primary | ICD-10-CM

## 2022-09-28 DIAGNOSIS — M25.461 EFFUSION OF RIGHT KNEE: ICD-10-CM

## 2022-09-28 PROCEDURE — 3074F SYST BP LT 130 MM HG: CPT | Performed by: PHYSICIAN ASSISTANT

## 2022-09-28 PROCEDURE — 99213 OFFICE O/P EST LOW 20 MIN: CPT | Performed by: PHYSICIAN ASSISTANT

## 2022-09-28 PROCEDURE — 3079F DIAST BP 80-89 MM HG: CPT | Performed by: PHYSICIAN ASSISTANT

## 2022-09-28 PROCEDURE — 3008F BODY MASS INDEX DOCD: CPT | Performed by: PHYSICIAN ASSISTANT

## 2022-11-21 ENCOUNTER — TELEMEDICINE (OUTPATIENT)
Dept: INTERNAL MEDICINE CLINIC | Facility: CLINIC | Age: 62
End: 2022-11-21
Payer: COMMERCIAL

## 2022-11-21 DIAGNOSIS — J01.00 ACUTE NON-RECURRENT MAXILLARY SINUSITIS: Primary | ICD-10-CM

## 2022-11-21 DIAGNOSIS — R42 DIZZINESS: ICD-10-CM

## 2022-11-21 DIAGNOSIS — R05.1 ACUTE COUGH: ICD-10-CM

## 2022-11-21 DIAGNOSIS — R06.2 WHEEZING: ICD-10-CM

## 2022-11-21 RX ORDER — AMOXICILLIN AND CLAVULANATE POTASSIUM 875; 125 MG/1; MG/1
1 TABLET, FILM COATED ORAL 2 TIMES DAILY
Qty: 20 TABLET | Refills: 0 | Status: SHIPPED | OUTPATIENT
Start: 2022-11-21 | End: 2022-12-01

## 2022-11-21 RX ORDER — PREDNISONE 20 MG/1
40 TABLET ORAL DAILY
Qty: 14 TABLET | Refills: 0 | Status: SHIPPED | OUTPATIENT
Start: 2022-11-21 | End: 2022-11-28

## 2022-11-21 RX ORDER — CODEINE PHOSPHATE AND GUAIFENESIN 10; 100 MG/5ML; MG/5ML
5 SOLUTION ORAL NIGHTLY PRN
Qty: 120 ML | Refills: 0 | Status: SHIPPED | OUTPATIENT
Start: 2022-11-21

## 2022-11-21 RX ORDER — ALBUTEROL SULFATE 90 UG/1
2 AEROSOL, METERED RESPIRATORY (INHALATION) EVERY 6 HOURS PRN
Qty: 1 EACH | Refills: 0 | Status: SHIPPED | OUTPATIENT
Start: 2022-11-21

## 2023-06-10 ENCOUNTER — HOSPITAL ENCOUNTER (OUTPATIENT)
Age: 63
Discharge: HOME | End: 2023-06-10
Payer: COMMERCIAL

## 2023-06-10 VITALS
HEART RATE: 90 BPM | HEIGHT: 63 IN | TEMPERATURE: 99 F | DIASTOLIC BLOOD PRESSURE: 88 MMHG | SYSTOLIC BLOOD PRESSURE: 135 MMHG | BODY MASS INDEX: 33.31 KG/M2 | OXYGEN SATURATION: 96 % | RESPIRATION RATE: 18 BRPM | WEIGHT: 188 LBS

## 2023-06-10 DIAGNOSIS — J06.9 UPPER RESPIRATORY INFECTION WITH COUGH AND CONGESTION: ICD-10-CM

## 2023-06-10 DIAGNOSIS — Z20.818 STREPTOCOCCUS EXPOSURE: ICD-10-CM

## 2023-06-10 DIAGNOSIS — R05.1 ACUTE COUGH: Primary | ICD-10-CM

## 2023-06-10 LAB
S PYO AG THROAT QL: NEGATIVE
SARS-COV-2 RNA RESP QL NAA+PROBE: NOT DETECTED

## 2023-06-10 RX ORDER — AZELASTINE 1 MG/ML
1 SPRAY, METERED NASAL 2 TIMES DAILY
Qty: 30 ML | Refills: 0 | Status: SHIPPED | OUTPATIENT
Start: 2023-06-10

## 2023-06-10 RX ORDER — CODEINE PHOSPHATE AND GUAIFENESIN 10; 100 MG/5ML; MG/5ML
5 SOLUTION ORAL EVERY 6 HOURS PRN
Qty: 180 ML | Refills: 0 | Status: SHIPPED | OUTPATIENT
Start: 2023-06-10

## 2023-06-10 RX ORDER — AMOXICILLIN AND CLAVULANATE POTASSIUM 875; 125 MG/1; MG/1
1 TABLET, FILM COATED ORAL 2 TIMES DAILY
Qty: 14 TABLET | Refills: 0 | Status: SHIPPED | OUTPATIENT
Start: 2023-06-10 | End: 2023-06-17

## 2023-06-10 RX ORDER — PREDNISONE 20 MG/1
40 TABLET ORAL DAILY
Qty: 10 TABLET | Refills: 0 | Status: SHIPPED | OUTPATIENT
Start: 2023-06-10 | End: 2023-06-15

## 2023-06-10 RX ORDER — BENZONATATE 100 MG/1
100 CAPSULE ORAL 3 TIMES DAILY PRN
Qty: 30 CAPSULE | Refills: 0 | Status: SHIPPED | OUTPATIENT
Start: 2023-06-10 | End: 2023-07-10

## 2023-06-10 NOTE — DISCHARGE INSTRUCTIONS
Take the medications as prescribed. Cool-mist humidifier in the same room. Hot steam showers. Steam inhalations. Sinus irrigation such as the [place] pot or sinus lavage. Interventions for sore throat: Warm salt water gargles 3 times daily. Take a teaspoon of honey on its own or another liquid like juice or tea. Cough or throat drops. Drink plenty of fluids, mainly consisting of water. Follow-up with your doctor in 2 to 3 days. Go to the nearest ER for new or worsening symptoms.

## 2023-06-10 NOTE — ED INITIAL ASSESSMENT (HPI)
Pt c/o cough, loss of voice and low grade fever around 0400am. Pt was around her Grandchildren Friday who were dx w/ Strep

## 2023-06-23 ENCOUNTER — TELEPHONE (OUTPATIENT)
Dept: INTERNAL MEDICINE CLINIC | Facility: CLINIC | Age: 63
End: 2023-06-23

## 2023-06-23 NOTE — TELEPHONE ENCOUNTER
Called patient. Patient reports 2 weeks ago became sick and went to  on 6/10. Patient was given antibiotics Augmentin and has since finished this. Pt states she feels her symptoms have never really gone away. She reports sore throat/productive cough/headache. Feels these symptoms are on and off. Advised patient need for f/u and pt verbalized understanding.      Appointment scheduled    Future Appointments   Date Time Provider Alvin Morelos   6/24/2023 10:00 AM Vanessa Montanez PA-C [facility]

## 2023-06-23 NOTE — TELEPHONE ENCOUNTER
Condition Update:  Cough, sore throat  Last OV/TE/Myc date:  6/10/23  Reason for visit/TE/Myc: cough/uri  Medication Prescribed: amox/clav, predniSONE, azelastine nasal  0.1%, cheratussin, benzonatate

## 2023-06-26 ENCOUNTER — OFFICE VISIT (OUTPATIENT)
Dept: INTERNAL MEDICINE CLINIC | Facility: CLINIC | Age: 63
End: 2023-06-26
Payer: COMMERCIAL

## 2023-06-26 VITALS
OXYGEN SATURATION: 98 % | SYSTOLIC BLOOD PRESSURE: 122 MMHG | HEIGHT: 63 IN | DIASTOLIC BLOOD PRESSURE: 78 MMHG | RESPIRATION RATE: 16 BRPM | HEART RATE: 69 BPM | BODY MASS INDEX: 33.49 KG/M2 | WEIGHT: 189 LBS

## 2023-06-26 DIAGNOSIS — J22 ACUTE LOWER RESPIRATORY INFECTION: ICD-10-CM

## 2023-06-26 DIAGNOSIS — J01.00 ACUTE NON-RECURRENT MAXILLARY SINUSITIS: Primary | ICD-10-CM

## 2023-06-26 PROBLEM — Z86.010 HX OF ADENOMATOUS COLONIC POLYPS: Status: RESOLVED | Noted: 2021-10-21 | Resolved: 2023-06-26

## 2023-06-26 PROBLEM — Z86.0101 HX OF ADENOMATOUS COLONIC POLYPS: Status: RESOLVED | Noted: 2021-10-21 | Resolved: 2023-06-26

## 2023-06-26 PROCEDURE — 90471 IMMUNIZATION ADMIN: CPT | Performed by: PHYSICIAN ASSISTANT

## 2023-06-26 PROCEDURE — 99213 OFFICE O/P EST LOW 20 MIN: CPT | Performed by: PHYSICIAN ASSISTANT

## 2023-06-26 PROCEDURE — 90715 TDAP VACCINE 7 YRS/> IM: CPT | Performed by: PHYSICIAN ASSISTANT

## 2023-06-26 PROCEDURE — 3078F DIAST BP <80 MM HG: CPT | Performed by: PHYSICIAN ASSISTANT

## 2023-06-26 PROCEDURE — 3008F BODY MASS INDEX DOCD: CPT | Performed by: PHYSICIAN ASSISTANT

## 2023-06-26 PROCEDURE — 3074F SYST BP LT 130 MM HG: CPT | Performed by: PHYSICIAN ASSISTANT

## 2023-06-26 RX ORDER — PREDNISONE 20 MG/1
20 TABLET ORAL DAILY
Qty: 7 TABLET | Refills: 0 | Status: SHIPPED | OUTPATIENT
Start: 2023-06-26 | End: 2023-07-03

## 2023-06-26 RX ORDER — DOXYCYCLINE HYCLATE 100 MG/1
100 CAPSULE ORAL 2 TIMES DAILY
Qty: 20 CAPSULE | Refills: 0 | Status: SHIPPED | OUTPATIENT
Start: 2023-06-26

## 2023-06-26 RX ORDER — CODEINE PHOSPHATE AND GUAIFENESIN 10; 100 MG/5ML; MG/5ML
5 SOLUTION ORAL NIGHTLY PRN
Qty: 120 ML | Refills: 0 | Status: SHIPPED | OUTPATIENT
Start: 2023-06-26

## 2023-07-12 ENCOUNTER — OFFICE VISIT (OUTPATIENT)
Dept: INTERNAL MEDICINE CLINIC | Facility: CLINIC | Age: 63
End: 2023-07-12
Payer: COMMERCIAL

## 2023-07-12 VITALS
RESPIRATION RATE: 16 BRPM | OXYGEN SATURATION: 98 % | WEIGHT: 186 LBS | BODY MASS INDEX: 32.96 KG/M2 | HEIGHT: 63 IN | TEMPERATURE: 97 F | HEART RATE: 78 BPM | DIASTOLIC BLOOD PRESSURE: 80 MMHG | SYSTOLIC BLOOD PRESSURE: 120 MMHG

## 2023-07-12 DIAGNOSIS — Z87.440 HISTORY OF RECURRENT CYSTITIS: ICD-10-CM

## 2023-07-12 DIAGNOSIS — R35.0 URINE FREQUENCY: ICD-10-CM

## 2023-07-12 DIAGNOSIS — N30.00 ACUTE CYSTITIS WITHOUT HEMATURIA: Primary | ICD-10-CM

## 2023-07-12 LAB
APPEARANCE: CLEAR
BILIRUBIN: NEGATIVE
GLUCOSE (URINE DIPSTICK): NEGATIVE MG/DL
KETONES (URINE DIPSTICK): NEGATIVE MG/DL
MULTISTIX LOT#: ABNORMAL NUMERIC
NITRITE, URINE: NEGATIVE
PH, URINE: 6 (ref 4.5–8)
PROTEIN (URINE DIPSTICK): NEGATIVE MG/DL
SPECIFIC GRAVITY: 1 (ref 1–1.03)
URINE-COLOR: YELLOW
UROBILINOGEN,SEMI-QN: 0.2 MG/DL (ref 0–1.9)

## 2023-07-12 PROCEDURE — 81003 URINALYSIS AUTO W/O SCOPE: CPT

## 2023-07-12 PROCEDURE — 87086 URINE CULTURE/COLONY COUNT: CPT

## 2023-07-12 PROCEDURE — 3079F DIAST BP 80-89 MM HG: CPT

## 2023-07-12 PROCEDURE — 3008F BODY MASS INDEX DOCD: CPT

## 2023-07-12 PROCEDURE — 99213 OFFICE O/P EST LOW 20 MIN: CPT

## 2023-07-12 PROCEDURE — 3074F SYST BP LT 130 MM HG: CPT

## 2023-07-12 RX ORDER — SULFAMETHOXAZOLE AND TRIMETHOPRIM 800; 160 MG/1; MG/1
1 TABLET ORAL 2 TIMES DAILY
Qty: 10 TABLET | Refills: 0 | Status: SHIPPED | OUTPATIENT
Start: 2023-07-12 | End: 2023-07-17

## 2023-07-12 RX ORDER — SULFAMETHOXAZOLE AND TRIMETHOPRIM 400; 80 MG/1; MG/1
1 TABLET ORAL 2 TIMES DAILY
COMMUNITY
End: 2023-07-12 | Stop reason: ALTCHOICE

## 2023-07-12 RX ORDER — SULFAMETHOXAZOLE AND TRIMETHOPRIM 400; 80 MG/1; MG/1
TABLET ORAL
Qty: 30 TABLET | Refills: 0 | Status: SHIPPED | OUTPATIENT
Start: 2023-07-12

## 2023-07-14 NOTE — PROGRESS NOTES
Spoke to pt. Made aware of results & recommendations. Pt voiced understanding.   Pt is till having symptoms so will finish antibiotics

## 2023-08-09 ENCOUNTER — OFFICE VISIT (OUTPATIENT)
Dept: INTERNAL MEDICINE CLINIC | Facility: CLINIC | Age: 63
End: 2023-08-09
Payer: COMMERCIAL

## 2023-08-09 VITALS
TEMPERATURE: 98 F | OXYGEN SATURATION: 98 % | WEIGHT: 185 LBS | HEART RATE: 102 BPM | SYSTOLIC BLOOD PRESSURE: 122 MMHG | HEIGHT: 63 IN | DIASTOLIC BLOOD PRESSURE: 80 MMHG | BODY MASS INDEX: 32.78 KG/M2 | RESPIRATION RATE: 16 BRPM

## 2023-08-09 DIAGNOSIS — R05.1 ACUTE COUGH: ICD-10-CM

## 2023-08-09 DIAGNOSIS — J01.01 ACUTE RECURRENT MAXILLARY SINUSITIS: Primary | ICD-10-CM

## 2023-08-09 PROCEDURE — 99214 OFFICE O/P EST MOD 30 MIN: CPT

## 2023-08-09 PROCEDURE — 3008F BODY MASS INDEX DOCD: CPT

## 2023-08-09 PROCEDURE — 3074F SYST BP LT 130 MM HG: CPT

## 2023-08-09 PROCEDURE — 3079F DIAST BP 80-89 MM HG: CPT

## 2023-08-09 RX ORDER — PREDNISONE 20 MG/1
40 TABLET ORAL DAILY
Qty: 14 TABLET | Refills: 0 | Status: SHIPPED | OUTPATIENT
Start: 2023-08-09 | End: 2023-08-16

## 2023-08-09 RX ORDER — ALBUTEROL SULFATE 90 UG/1
2 AEROSOL, METERED RESPIRATORY (INHALATION) EVERY 6 HOURS PRN
Qty: 1 EACH | Refills: 0 | Status: SHIPPED | OUTPATIENT
Start: 2023-08-09

## 2023-08-09 RX ORDER — DOXYCYCLINE HYCLATE 100 MG/1
100 CAPSULE ORAL 2 TIMES DAILY
Qty: 14 CAPSULE | Refills: 0 | Status: SHIPPED | OUTPATIENT
Start: 2023-08-09

## 2023-09-20 DIAGNOSIS — I10 ESSENTIAL HYPERTENSION, BENIGN: ICD-10-CM

## 2023-09-21 RX ORDER — LISINOPRIL 10 MG/1
TABLET ORAL
Qty: 90 TABLET | Refills: 0 | Status: SHIPPED | OUTPATIENT
Start: 2023-09-21 | End: 2023-09-25

## 2023-09-21 NOTE — TELEPHONE ENCOUNTER
Last time medication was refilled 09/12/2022  Quantity and # of refills 90 w/ 3  Last OV 08/09/2023  Next OV   Future Appointments   Date Time Provider Alvin Morelos   9/25/2023  8:30 AM KVNG Ruff [facility] [address]       Passed protocol, Rx sent.

## 2023-09-25 ENCOUNTER — OFFICE VISIT (OUTPATIENT)
Dept: INTERNAL MEDICINE CLINIC | Facility: CLINIC | Age: 63
End: 2023-09-25
Payer: COMMERCIAL

## 2023-09-25 ENCOUNTER — LAB ENCOUNTER (OUTPATIENT)
Dept: LAB | Age: 63
End: 2023-09-25
Attending: NURSE PRACTITIONER
Payer: MEDICARE

## 2023-09-25 VITALS
HEART RATE: 80 BPM | WEIGHT: 186 LBS | BODY MASS INDEX: 32.96 KG/M2 | HEIGHT: 63 IN | OXYGEN SATURATION: 95 % | SYSTOLIC BLOOD PRESSURE: 118 MMHG | TEMPERATURE: 97 F | RESPIRATION RATE: 16 BRPM | DIASTOLIC BLOOD PRESSURE: 80 MMHG

## 2023-09-25 DIAGNOSIS — E78.2 MIXED HYPERLIPIDEMIA: ICD-10-CM

## 2023-09-25 DIAGNOSIS — M54.50 CHRONIC MIDLINE LOW BACK PAIN WITHOUT SCIATICA: ICD-10-CM

## 2023-09-25 DIAGNOSIS — G25.0 BENIGN ESSENTIAL TREMOR: ICD-10-CM

## 2023-09-25 DIAGNOSIS — Z00.00 WELCOME TO MEDICARE PREVENTIVE VISIT: Primary | ICD-10-CM

## 2023-09-25 DIAGNOSIS — Z11.59 ENCOUNTER FOR HEPATITIS C SCREENING TEST FOR LOW RISK PATIENT: ICD-10-CM

## 2023-09-25 DIAGNOSIS — Z12.31 ENCOUNTER FOR SCREENING MAMMOGRAM FOR MALIGNANT NEOPLASM OF BREAST: ICD-10-CM

## 2023-09-25 DIAGNOSIS — Z23 NEED FOR VACCINATION: ICD-10-CM

## 2023-09-25 DIAGNOSIS — M54.6 CHRONIC MIDLINE THORACIC BACK PAIN: ICD-10-CM

## 2023-09-25 DIAGNOSIS — G89.29 CHRONIC MIDLINE THORACIC BACK PAIN: ICD-10-CM

## 2023-09-25 DIAGNOSIS — N39.0 RECURRENT UTI: ICD-10-CM

## 2023-09-25 DIAGNOSIS — G89.29 CHRONIC MIDLINE LOW BACK PAIN WITHOUT SCIATICA: ICD-10-CM

## 2023-09-25 DIAGNOSIS — I10 ESSENTIAL HYPERTENSION, BENIGN: ICD-10-CM

## 2023-09-25 DIAGNOSIS — Z78.0 POST-MENOPAUSAL: ICD-10-CM

## 2023-09-25 PROBLEM — N18.30 CKD (CHRONIC KIDNEY DISEASE) STAGE 3, GFR 30-59 ML/MIN (HCC): Chronic | Status: RESOLVED | Noted: 2023-09-25 | Resolved: 2023-09-25

## 2023-09-25 PROBLEM — N18.30 CKD (CHRONIC KIDNEY DISEASE) STAGE 3, GFR 30-59 ML/MIN (HCC): Chronic | Status: ACTIVE | Noted: 2023-09-25

## 2023-09-25 LAB
ALBUMIN SERPL-MCNC: 3.9 G/DL (ref 3.4–5)
ALBUMIN/GLOB SERPL: 1.1 {RATIO} (ref 1–2)
ALP LIVER SERPL-CCNC: 67 U/L
ALT SERPL-CCNC: 45 U/L
ANION GAP SERPL CALC-SCNC: 2 MMOL/L (ref 0–18)
AST SERPL-CCNC: 28 U/L (ref 15–37)
ATRIAL RATE: 70 BPM
BASOPHILS # BLD AUTO: 0.05 X10(3) UL (ref 0–0.2)
BASOPHILS NFR BLD AUTO: 0.8 %
BILIRUB SERPL-MCNC: 0.6 MG/DL (ref 0.1–2)
BUN BLD-MCNC: 18 MG/DL (ref 7–18)
CALCIUM BLD-MCNC: 9.2 MG/DL (ref 8.5–10.1)
CHLORIDE SERPL-SCNC: 109 MMOL/L (ref 98–112)
CHOLEST SERPL-MCNC: 168 MG/DL (ref ?–200)
CO2 SERPL-SCNC: 28 MMOL/L (ref 21–32)
CREAT BLD-MCNC: 1.14 MG/DL
EGFRCR SERPLBLD CKD-EPI 2021: 53 ML/MIN/1.73M2 (ref 60–?)
EOSINOPHIL # BLD AUTO: 0 X10(3) UL (ref 0–0.7)
EOSINOPHIL NFR BLD AUTO: 0 %
ERYTHROCYTE [DISTWIDTH] IN BLOOD BY AUTOMATED COUNT: 12.9 %
FASTING PATIENT LIPID ANSWER: YES
FASTING STATUS PATIENT QL REPORTED: YES
GLOBULIN PLAS-MCNC: 3.5 G/DL (ref 2.8–4.4)
GLUCOSE BLD-MCNC: 99 MG/DL (ref 70–99)
HCT VFR BLD AUTO: 41.9 %
HCV AB SERPL QL IA: NONREACTIVE
HDLC SERPL-MCNC: 64 MG/DL (ref 40–59)
HGB BLD-MCNC: 13.8 G/DL
IMM GRANULOCYTES # BLD AUTO: 0.02 X10(3) UL (ref 0–1)
IMM GRANULOCYTES NFR BLD: 0.3 %
LDLC SERPL CALC-MCNC: 84 MG/DL (ref ?–100)
LYMPHOCYTES # BLD AUTO: 1.59 X10(3) UL (ref 1–4)
LYMPHOCYTES NFR BLD AUTO: 26.1 %
MCH RBC QN AUTO: 31 PG (ref 26–34)
MCHC RBC AUTO-ENTMCNC: 32.9 G/DL (ref 31–37)
MCV RBC AUTO: 94.2 FL
MONOCYTES # BLD AUTO: 0.39 X10(3) UL (ref 0.1–1)
MONOCYTES NFR BLD AUTO: 6.4 %
NEUTROPHILS # BLD AUTO: 4.04 X10 (3) UL (ref 1.5–7.7)
NEUTROPHILS # BLD AUTO: 4.04 X10(3) UL (ref 1.5–7.7)
NEUTROPHILS NFR BLD AUTO: 66.4 %
NONHDLC SERPL-MCNC: 104 MG/DL (ref ?–130)
OSMOLALITY SERPL CALC.SUM OF ELEC: 290 MOSM/KG (ref 275–295)
P AXIS: 39 DEGREES
P-R INTERVAL: 226 MS
PLATELET # BLD AUTO: 255 10(3)UL (ref 150–450)
POTASSIUM SERPL-SCNC: 4.6 MMOL/L (ref 3.5–5.1)
PROT SERPL-MCNC: 7.4 G/DL (ref 6.4–8.2)
Q-T INTERVAL: 402 MS
QRS DURATION: 96 MS
QTC CALCULATION (BEZET): 434 MS
R AXIS: 38 DEGREES
RBC # BLD AUTO: 4.45 X10(6)UL
SODIUM SERPL-SCNC: 139 MMOL/L (ref 136–145)
T AXIS: 28 DEGREES
TRIGL SERPL-MCNC: 115 MG/DL (ref 30–149)
VENTRICULAR RATE: 70 BPM
VLDLC SERPL CALC-MCNC: 18 MG/DL (ref 0–30)
WBC # BLD AUTO: 6.1 X10(3) UL (ref 4–11)

## 2023-09-25 PROCEDURE — 87086 URINE CULTURE/COLONY COUNT: CPT

## 2023-09-25 PROCEDURE — 80053 COMPREHEN METABOLIC PANEL: CPT

## 2023-09-25 PROCEDURE — 85025 COMPLETE CBC W/AUTO DIFF WBC: CPT

## 2023-09-25 PROCEDURE — G0402 INITIAL PREVENTIVE EXAM: HCPCS | Performed by: NURSE PRACTITIONER

## 2023-09-25 PROCEDURE — 90677 PCV20 VACCINE IM: CPT | Performed by: NURSE PRACTITIONER

## 2023-09-25 PROCEDURE — 3008F BODY MASS INDEX DOCD: CPT | Performed by: NURSE PRACTITIONER

## 2023-09-25 PROCEDURE — 36415 COLL VENOUS BLD VENIPUNCTURE: CPT

## 2023-09-25 PROCEDURE — 3079F DIAST BP 80-89 MM HG: CPT | Performed by: NURSE PRACTITIONER

## 2023-09-25 PROCEDURE — 3074F SYST BP LT 130 MM HG: CPT | Performed by: NURSE PRACTITIONER

## 2023-09-25 PROCEDURE — 96160 PT-FOCUSED HLTH RISK ASSMT: CPT | Performed by: NURSE PRACTITIONER

## 2023-09-25 PROCEDURE — 99213 OFFICE O/P EST LOW 20 MIN: CPT | Performed by: NURSE PRACTITIONER

## 2023-09-25 PROCEDURE — 86803 HEPATITIS C AB TEST: CPT

## 2023-09-25 PROCEDURE — G0008 ADMIN INFLUENZA VIRUS VAC: HCPCS | Performed by: NURSE PRACTITIONER

## 2023-09-25 PROCEDURE — 80061 LIPID PANEL: CPT

## 2023-09-25 PROCEDURE — G0009 ADMIN PNEUMOCOCCAL VACCINE: HCPCS | Performed by: NURSE PRACTITIONER

## 2023-09-25 PROCEDURE — G0403 EKG FOR INITIAL PREVENT EXAM: HCPCS | Performed by: NURSE PRACTITIONER

## 2023-09-25 PROCEDURE — 90662 IIV NO PRSV INCREASED AG IM: CPT | Performed by: NURSE PRACTITIONER

## 2023-09-25 RX ORDER — ATORVASTATIN CALCIUM 20 MG/1
20 TABLET, FILM COATED ORAL NIGHTLY
Qty: 90 TABLET | Refills: 3 | Status: SHIPPED | OUTPATIENT
Start: 2023-09-25

## 2023-09-25 RX ORDER — SULFAMETHOXAZOLE AND TRIMETHOPRIM 400; 80 MG/1; MG/1
TABLET ORAL
Qty: 30 TABLET | Refills: 1 | Status: SHIPPED | OUTPATIENT
Start: 2023-09-25

## 2023-09-25 RX ORDER — LISINOPRIL 10 MG/1
10 TABLET ORAL DAILY
Qty: 90 TABLET | Refills: 3 | Status: SHIPPED | OUTPATIENT
Start: 2023-09-25

## 2023-09-26 PROBLEM — Z78.9 HEPATITIS C ANTIBODY TEST NEGATIVE: Status: ACTIVE | Noted: 2023-09-26

## 2023-09-26 NOTE — PROGRESS NOTES
Spoke to pt. Made aware of results & recommendations. Pt voiced understanding.   Order placed  Notified will call with culture results

## 2023-10-02 ENCOUNTER — HOSPITAL ENCOUNTER (OUTPATIENT)
Dept: BONE DENSITY | Age: 63
Discharge: HOME | End: 2023-10-02
Attending: NURSE PRACTITIONER
Payer: MEDICARE

## 2023-10-02 ENCOUNTER — LAB ENCOUNTER (OUTPATIENT)
Dept: LAB | Facility: HOSPITAL | Age: 63
End: 2023-10-02
Attending: NURSE PRACTITIONER
Payer: MEDICARE

## 2023-10-02 ENCOUNTER — PATIENT MESSAGE (OUTPATIENT)
Dept: INTERNAL MEDICINE CLINIC | Facility: CLINIC | Age: 63
End: 2023-10-02

## 2023-10-02 DIAGNOSIS — N30.90 CYSTITIS: ICD-10-CM

## 2023-10-02 DIAGNOSIS — Z78.0 POST-MENOPAUSAL: ICD-10-CM

## 2023-10-02 LAB
BILIRUB UR QL STRIP.AUTO: NEGATIVE
CLARITY UR REFRACT.AUTO: CLEAR
COLOR UR AUTO: YELLOW
GLUCOSE UR STRIP.AUTO-MCNC: NORMAL MG/DL
KETONES UR STRIP.AUTO-MCNC: NEGATIVE MG/DL
LEUKOCYTE ESTERASE UR QL STRIP.AUTO: NEGATIVE
NITRITE UR QL STRIP.AUTO: NEGATIVE
PH UR STRIP.AUTO: 6 [PH] (ref 5–8)
PROT UR STRIP.AUTO-MCNC: NEGATIVE MG/DL
RBC UR QL AUTO: NEGATIVE
SP GR UR STRIP.AUTO: 1 (ref 1–1.03)
UROBILINOGEN UR STRIP.AUTO-MCNC: NORMAL MG/DL

## 2023-10-02 PROCEDURE — 81003 URINALYSIS AUTO W/O SCOPE: CPT

## 2023-10-02 PROCEDURE — 87086 URINE CULTURE/COLONY COUNT: CPT

## 2023-10-02 PROCEDURE — 77080 DXA BONE DENSITY AXIAL: CPT | Performed by: NURSE PRACTITIONER

## 2023-10-02 NOTE — PROGRESS NOTES
Spoke to pt. Made aware of results & recommendations. Pt voiced understanding.   Pt c/o painful urination with urgency and frequency, pt ook Bactrim and peridium  But not improvement  Per Zeke Glover order another urine with culture

## 2023-10-03 NOTE — TELEPHONE ENCOUNTER
Discussed with Kyle Cisneros PA-C: culture result will be back tomorrow or Thursday, which will confirm if there is a bacterial infection. it is safe to take pyridium for longer, this is to make sure UTI doesn't go untreated. recommend consult with urogyne to discuss if interstitial cystitis is a possiblity. I don't think she should take the bactrim since the UA is normal. ok to continue pyridium until culture is resulted.

## 2023-10-04 ENCOUNTER — HOSPITAL ENCOUNTER (OUTPATIENT)
Dept: ULTRASOUND IMAGING | Age: 63
Discharge: HOME | End: 2023-10-04
Attending: INTERNAL MEDICINE
Payer: MEDICARE

## 2023-10-04 DIAGNOSIS — R79.89 ELEVATED SERUM CREATININE: ICD-10-CM

## 2023-10-04 PROCEDURE — 76770 US EXAM ABDO BACK WALL COMP: CPT | Performed by: INTERNAL MEDICINE

## 2023-10-04 NOTE — TELEPHONE ENCOUNTER
Vanessa Montanez PA-C  10/4/2023 10:46 AM CDT Back to Top      Final culture result is negative, no need for antibiotic at this time   Result discussed.

## 2023-11-22 ENCOUNTER — HOSPITAL ENCOUNTER (OUTPATIENT)
Dept: MAMMOGRAPHY | Facility: HOSPITAL | Age: 63
Discharge: HOME | End: 2023-11-22
Attending: NURSE PRACTITIONER
Payer: MEDICARE

## 2023-11-22 DIAGNOSIS — Z12.31 ENCOUNTER FOR SCREENING MAMMOGRAM FOR MALIGNANT NEOPLASM OF BREAST: ICD-10-CM

## 2023-11-22 PROCEDURE — 77067 SCR MAMMO BI INCL CAD: CPT | Performed by: NURSE PRACTITIONER

## 2023-11-22 PROCEDURE — 77063 BREAST TOMOSYNTHESIS BI: CPT | Performed by: NURSE PRACTITIONER

## 2024-01-11 ENCOUNTER — TELEPHONE (OUTPATIENT)
Dept: UROLOGY | Facility: CLINIC | Age: 64
End: 2024-01-11

## 2024-01-16 ENCOUNTER — OFFICE VISIT (OUTPATIENT)
Dept: UROLOGY | Facility: CLINIC | Age: 64
End: 2024-01-16
Attending: OBSTETRICS & GYNECOLOGY
Payer: MEDICARE

## 2024-01-16 VITALS — RESPIRATION RATE: 16 BRPM | HEIGHT: 62 IN | WEIGHT: 188 LBS | TEMPERATURE: 98 F | BODY MASS INDEX: 34.6 KG/M2

## 2024-01-16 DIAGNOSIS — N95.2 POSTMENOPAUSAL ATROPHIC VAGINITIS: ICD-10-CM

## 2024-01-16 DIAGNOSIS — N81.6 RECTOCELE: Primary | ICD-10-CM

## 2024-01-16 DIAGNOSIS — N39.3 FEMALE STRESS INCONTINENCE: ICD-10-CM

## 2024-01-16 DIAGNOSIS — N39.41 URGE INCONTINENCE: ICD-10-CM

## 2024-01-16 DIAGNOSIS — R35.1 NOCTURIA: ICD-10-CM

## 2024-01-16 DIAGNOSIS — N81.84 PELVIC MUSCLE WASTING: ICD-10-CM

## 2024-01-16 LAB
BLOOD URINE: NEGATIVE
CONTROL RUN WITHIN 24 HOURS?: YES
LEUKOCYTE ESTERASE URINE: NEGATIVE
NITRITE URINE: NEGATIVE

## 2024-01-16 PROCEDURE — 99212 OFFICE O/P EST SF 10 MIN: CPT

## 2024-01-16 PROCEDURE — 87086 URINE CULTURE/COLONY COUNT: CPT | Performed by: OBSTETRICS & GYNECOLOGY

## 2024-01-16 PROCEDURE — 81002 URINALYSIS NONAUTO W/O SCOPE: CPT | Performed by: OBSTETRICS & GYNECOLOGY

## 2024-01-16 RX ORDER — ESTRADIOL 0.1 MG/G
CREAM VAGINAL
Qty: 42.5 G | Refills: 3 | Status: SHIPPED | OUTPATIENT
Start: 2024-01-16

## 2024-01-16 NOTE — PROGRESS NOTES
Josette Tavares, DO  2024     Referred by JOSE GUADALUPE Montanez  Pt here with self    Chief Complaint   Patient presents with    Other     Referred by Selina Leonard NP  Frequent UTI symptoms in absence of culture proven UTI   23- UA with leuks-and blood tx with Bactrim DS then started Bactrim SS post coital       HPI:  +QUINN  +UUI  Nocturia x1  No prolapse sx  Wonders about rectocele  H/o diverticular disease  Colon resection  Splints for BM  Denies dyspareunia  irreg bowels    PRIOR TREATMENTS:    Kegels  Estrace Cream    H/o UTIs   Using bactrim ss post coitus  Recent s/sx of UTI - bactrim tx    No gross hematuria    , vdx3    She is bothered by s/sx of UTI -- pain, urinary urgency, urinary frequency    Rx'd PFPT in the past, hasn't done it  Renal ultrasound wnl recently    Vitals:  Temp 97.9 °F (36.6 °C) (Temporal)   Resp 16   Ht 62\"   Wt 188 lb (85.3 kg)   BMI 34.39 kg/m²      HISTORY:  Past Medical History:   Diagnosis Date    Abdominal pain Unknown    Occasional    Back pain ´94    Bx Sx X 3    Back problem     Bad breath Unknown    Occasional    Constipation Unknown    Since childhood    Diverticulitis 2015    Diverticulosis     Essential hypertension     Flatulence/gas pain/belching Unknown    Since childhood    Fracture     Rt shoulder    Headache disorder Unknown    Migraines since childhood, very rare since menopause    High blood pressure     High cholesterol Unknown, ~ 5 or 6 yrs    Controlled with statin    Hx of adenomatous colonic polyps 10/21/2021    Hyperlipidemia     Irritable bowel syndrome     Uncomfortable fullness after meals Unknown    Occasional    Visual impairment     Wears glasses Unknown    Glasses      Past Surgical History:   Procedure Laterality Date    BACK SURGERY      x2    COLECTOMY  2022    COLONOSCOPY      COLONOSCOPY  2018    EGD  10/19/2016    At [facility] (also had one at this location, date unknown)          OTHER SURGICAL HISTORY      uterine ablation     SINUS SURGERY        SPINE SURGERY PROCEDURE UNLISTED  ´94, ´04, ´97    TONSILLECTOMY  ´81    Tonsillectomy & Carter-Srinivas      Family History   Problem Relation Age of Onset    Breast Cancer Mother 81    Diabetes Mother         Type 2    Hypertension Mother     Stroke Mother     Breast Cancer Sister 40    Breast Cancer Maternal Grandmother 80    Breast Cancer Sister     Colon Polyps Father     Hypertension Father     Hypertension Sister     Hypertension Sister       Social History     Socioeconomic History    Marital status:    Tobacco Use    Smoking status: Never    Smokeless tobacco: Never   Vaping Use    Vaping Use: Never used   Substance and Sexual Activity    Alcohol use: Yes     Alcohol/week: 0.0 standard drinks of alcohol     Comment: Rare    Drug use: Never        Allergies:  No Known Allergies    Medications:  Outpatient Medications Prior to Visit   Medication Sig Dispense Refill    atorvastatin 20 MG Oral Tab Take 1 tablet (20 mg total) by mouth nightly. 90 tablet 3    lisinopril 10 MG Oral Tab Take 1 tablet (10 mg total) by mouth daily. 90 tablet 3    sulfamethoxazole-trimethoprim (BACTRIM) 400-80 MG Oral Tab Take one tablet after intercourse as needed. 30 tablet 1    Cholecalciferol (VITAMIN D-3) 25 MCG (1000 UT) Oral Cap       Melatonin 10 MG Oral Tab       Multiple Minerals-Vitamins (CALCIUM CITRATE PLUS/MAGNESIUM) Oral Tab       Multiple Vitamins-Minerals (HAIR SKIN AND NAILS FORMULA OR) Take 1 tablet by mouth daily.      Levocetirizine Dihydrochloride 5 MG Oral Tab Take 1 tablet (5 mg total) by mouth every evening.      Multiple Vitamin (MULTI-VITAMIN DAILY) Oral Tab Take by mouth daily.      albuterol 108 (90 Base) MCG/ACT Inhalation Aero Soln Inhale 2 puffs into the lungs every 6 (six) hours as needed for Wheezing or Shortness of Breath. (Patient not taking: Reported on 1/16/2024) 1 each 0    ketoconazole 2 % External Shampoo APPLY TOPICALLY TO SCALP AND ALLOW TO SIT FOR A FEW MINUTES  THEN RINSE. USE THREE TIMES A WEEK. (Patient not taking: Reported on 1/16/2024)       No facility-administered medications prior to visit.       Urogynecology Summary:  Urogynecology Summary  Prolapse: No  QUINN: Yes  Urge Incontinence: Yes (rare)  Nocturia Frequency: 1  Frequency: Greater than 3 hours  Incomplete emptying: No  Constipation:  (sometimes/strains)  Wears pad day?: 1 (light)  Wears Pad Night?: 1 (light)  Activities are limited by UI/POP?: Yes (leaking)  Currently Sexually Active: Yes (occasional leaking with intercourse)    Review of Systems:    A comprehensive 12 point review of systems was completed.  Pertinent positives noted in the the HPI.  No CP  No SOB    GENERAL EXAM:  GENERAL:  Alert and Oriented, and NAD  HEENT:  Normal, no lesions  LUNGS:  Normal effort  HEART:  RRR  ABDOMEN: soft, no mass, no hernia  EXTREM:  Normal, no edema  SKIN:  Normal, no lesions    PELVIC EXAM:  Ext. Gen: some atrophy, no lesions  Urethra: +atrophy, nontender  Bladder:some fullness, nontender  Vagina: ++atrophy  Cervix: no bleeding, no lesions, nontender  Uterus: +mobile  Adnexa:no masses, nontender  Perineum: nontender  Anus: wnl  Rectum: defer    PELVIS FLOOR NEUROMUSCULAR FUNCTION:  Strength:  1 and Unable to hold greater than 3 sec  Perineal Sensation:  Normal      PELVIC SUPPORT:  [place]:  0  Ant:  0  Post:  2  CST:  negative  UVJ: somewhat hypermobile    Impression/Plan:    ICD-10-CM    1. Rectocele  N81.6       2. Pelvic muscle wasting  N81.84       3. Postmenopausal atrophic vaginitis  N95.2 estradiol (ESTRACE) 0.1 MG/GM Vaginal Cream      4. Urge incontinence  N39.41 POCT urinalysis dipstick[86810]     Urine Culture, Routine      5. Female stress incontinence  N39.3 POCT urinalysis dipstick[67220]     Urine Culture, Routine      6. Nocturia  R35.1 POCT urinalysis dipstick[81192]     Urine Culture, Routine          Discussion Items:   Urodynamics and cystoscopy for evaluation of LUTS  Behavioral and pharmacologic  treatments for OAB  Nonsurgical and surgical treatments for Stress Urinary Incontinence  Nonsurgical and surgical treatments for POP  Pelvic muscle rehabilitation including pelvic floor PT  Topical estrogen therapy for treating UGA  Behavioral and medical treatment for recurrent UTIs  Bowel routine/constipation regimen  Discussed dietary and behavioral modification, discussed pharmacologic and nonpharmacologic mgmt options for urinary symptoms.     Diagnostic Items:  Urodynamics  Urine testing    Medications Discussed:  Estrace Cream  Fiber  Miralax    Treatment Plan, Non-surgical:   RN teaching/pt education done  Fiber supplement  Stimulant:  MOM, Miralax  Estrace / Premarin cream    Treatment Plan, Surgical:   None    Pt verbalizes understanding of all above discussed information. All questions answered. She agrees to plan    Return for UDS, f/u.    Josette Tavares DO, FACOG, FACS      Discussion undertaken in English, info provided

## 2024-01-22 ENCOUNTER — TELEPHONE (OUTPATIENT)
Dept: UROLOGY | Facility: CLINIC | Age: 64
End: 2024-01-22

## 2024-01-22 NOTE — TELEPHONE ENCOUNTER
TC from patient  Started estrace vag cream 2 days ago  Noted vaginal spotting approx 3 hours later  No pad usage, just while wiping  Yesterday noted minimal vag spotting  Today no spotting noted  Still has uterus, no recent pelvic ultrasounds on file  Pt plans to use estrace again tomorrow night  Will provide  with condition update

## 2024-01-25 ENCOUNTER — HOSPITAL ENCOUNTER (OUTPATIENT)
Dept: ULTRASOUND IMAGING | Age: 64
Discharge: HOME | End: 2024-01-25
Attending: OBSTETRICS & GYNECOLOGY
Payer: MEDICARE

## 2024-01-25 DIAGNOSIS — N95.0 POSTMENOPAUSAL BLEEDING: ICD-10-CM

## 2024-01-25 PROCEDURE — 76856 US EXAM PELVIC COMPLETE: CPT | Performed by: OBSTETRICS & GYNECOLOGY

## 2024-01-25 PROCEDURE — 76830 TRANSVAGINAL US NON-OB: CPT | Performed by: OBSTETRICS & GYNECOLOGY

## 2024-01-29 ENCOUNTER — TELEPHONE (OUTPATIENT)
Dept: UROLOGY | Facility: CLINIC | Age: 64
End: 2024-01-29

## 2024-01-31 ENCOUNTER — OFFICE VISIT (OUTPATIENT)
Dept: UROLOGY | Facility: CLINIC | Age: 64
End: 2024-01-31
Attending: OBSTETRICS & GYNECOLOGY
Payer: MEDICARE

## 2024-01-31 VITALS
HEIGHT: 62 IN | DIASTOLIC BLOOD PRESSURE: 84 MMHG | SYSTOLIC BLOOD PRESSURE: 138 MMHG | WEIGHT: 188 LBS | BODY MASS INDEX: 34.6 KG/M2

## 2024-01-31 DIAGNOSIS — N81.6 RECTOCELE: ICD-10-CM

## 2024-01-31 DIAGNOSIS — N39.41 URGE INCONTINENCE: ICD-10-CM

## 2024-01-31 DIAGNOSIS — N39.3 FEMALE STRESS INCONTINENCE: Primary | ICD-10-CM

## 2024-01-31 PROCEDURE — 81002 URINALYSIS NONAUTO W/O SCOPE: CPT

## 2024-01-31 PROCEDURE — 51741 ELECTRO-UROFLOWMETRY FIRST: CPT

## 2024-01-31 PROCEDURE — 51784 ANAL/URINARY MUSCLE STUDY: CPT

## 2024-01-31 PROCEDURE — 51797 INTRAABDOMINAL PRESSURE TEST: CPT

## 2024-01-31 PROCEDURE — 51729 CYSTOMETROGRAM W/VP&UP: CPT

## 2024-01-31 NOTE — PROCEDURES
Patient here for urodynamic testing.  Procedure explained and confirmed by patient.  See evaluation form for results.  Both verbal and written discharge instructions were given.  Patient tolerated procedure well and will follow up with Dr. Josette Tavares on 24.    URODYNAMIC EVALUATION    PATIENT HISTORY:    Prolapse:  Yes  QUINN:  Yes, much improved recently  UUI:  Yes, much improved recently  Nocturia:  1  Frequency:  >3 hours  Sense of Incomplete Emptying:  No  Constipation:  Yes, sometimes, using fiber and Miralax  Last void prior to UDS testin+min  Current urge to void?  Moderate  OAB meds stopped prior to test?  NA  Other symptoms?      Surgery?  []  No  []  Interested in surgery:   [x]  Yes, specify date: 3/7/24 HYSTEROSCOPY WITH DILATION AND CURETTAGE, POSSIBLE POLYPECTOMY     Surgical folder provided?  [x]  Yes  []  No     PATIENT DIAGNOSIS:  Rectocele, Midline R15.9, Urge Incontinence N39.41, and Stress Incontinence N39.3    UDS PROCEDURAL FINDINGS:  Rectocele, Midline R15.9, Stress Incontinence N39.3, and Detrusor Instability N31.9    MEDICATION:   Outpatient Medications Marked as Taking for the 24 encounter (Office Visit) with LIS PROCEDURE   Medication Sig Dispense Refill    Fexofenadine HCl 30 MG Oral Tablet Dispersible Take 1 tablet (30 mg total) by mouth daily.      estradiol (ESTRACE) 0.1 MG/GM Vaginal Cream Apply 1/2 gram vaginally 2 times per week. 42.5 g 3    atorvastatin 20 MG Oral Tab Take 1 tablet (20 mg total) by mouth nightly. 90 tablet 3    lisinopril 10 MG Oral Tab Take 1 tablet (10 mg total) by mouth daily. 90 tablet 3    sulfamethoxazole-trimethoprim (BACTRIM) 400-80 MG Oral Tab Take one tablet after intercourse as needed. 30 tablet 1    Cholecalciferol (VITAMIN D-3) 25 MCG (1000 UT) Oral Cap       Melatonin 10 MG Oral Tab       Multiple Minerals-Vitamins (CALCIUM CITRATE PLUS/MAGNESIUM) Oral Tab       Multiple Vitamins-Minerals (HAIR SKIN AND NAILS FORMULA OR) Take 1  tablet by mouth daily.      Multiple Vitamin (MULTI-VITAMIN DAILY) Oral Tab Take by mouth daily.          ALLERGIES:  Patient has no known allergies.      EXAM:  Urinalysis Dip:  Today's Results   Component Date Value    control run 01/31/2024 Yes     Blood Urine 01/31/2024 Negative     Nitrite Urine 01/31/2024 Negative     Leukocyte esterase urine 01/31/2024 Negative       [place] ( <30 degrees ):  []  Mobile  [x]  Fixed    Perineal Sensation:  [x]  Normal  []  Abnormal    Additional Notes:    PROLAPSE:  [x]  Yes  []  No  Prolapse reduced for testing?  [x]  Yes  []  No  []  Pessary  []  Manual  [x]  Half Speculum    Additional Notes:    UROFLOWMETRY:  Unreduced  Voided Volume:             303                mL  Maximum Flow Rate:                    18            mL/sec  Average flow rate:                 9            mL/sec  Post-void Residual:                50           mL  Pattern:  [x]  Normal  []  Poor flow     []  Intermittent  []  Other  Void:   [x]  Typical  []  Atypical    Additional Notes:    CYSTOMETRY:  Urethral Catheter:  Fr 7 / tdoc  Abd Catheter:     Fr 7 / tdoc   Infusion:  Water Rate 30mL/min, decreased to 20 mL/min with onset of DO  Temp:  Room  Position:  [x]  Sit  []  Stand  []  Supine  First sensation:   220 mL  First desire to void:   226 mL  Strong desire to void:  243 mL  Maximum cystometric capacity:   246 mL  Detrusor Activity:  [x]  Unstable   []  Stable  Urge leakage?    [x]  Yes []  No  Volume at 1st unhibited detrusor cont:   200 mL  Detrusor instability provoked by:    []  Spontaneous []  Coughing  [x]  Filling  []  Valsalva  [x]  Other, with UPP assessment    Additional Notes:  +DO with leak during UPP assessment.  DO persisted following initial DO, no leaks.  Patient progressed quickly from first desire to capacity after initial DO episode.      URETHRAL FUNCTION:  Valsava (vesical) Leak Point Pressures:    Volume Leak Point Pressure Leak?    Cough Valsalva      100mL 156    77  cm H2O []  Yes [x]  No         REDUCED: 1/2 speculum  Volume Leak Point Pressure Leak?    Cough Valsalva      100mL 122 61    cm H2O [x]  Yes []  No   200mL 114 60    cm H2O [x]  Yes []  No       Genuine Stress Incontinence demonstrated?   [x]  Yes@100mL reduced with cough in absence of DO  []  No    Resting Urethral Pressure Profile:     Functional Urethral Length:         0.7 cm            0.8     cm     Maximum UCP:          55 cm            55 cm       PRESSURE/FLOW STUDY:  Unreduced  Voided volume:   323     mL  Maximum flow rate:       12 mL/sec  Pressure Detrusor (at maximum flow):       46     cm H2O  Post void residual:         103      mL  Voiding mechanism:  []  Abnormal  [x]  Normal  [x]  Strain to void   []  Weak detrusor    Additional Notes:  Patient felt need to strain for void with catheters in place during pressure flow study.  Uroflowmetry, cystometry, and pressure / flow study were completed using calibrated electronic equipment and air charged transducers.    EMG:  During fill: Reactive    During flow study: Non-reactive    1/31/2024 10:16 AM     PERFORMED BY:  Cristofer OLEARY RN      URODYNAMIC PHYSICIAN INTERPRETATION    IMPRESSION:   303/50cc & 323/103cc  longterm 246cc  DO @200cc  QUINN Yes@100mL reduced with cough in absence of DO     Post-Procedure Diagnoses: Detrusor instability [N31.9] and Incomplete bladder emptying [R39.14]    Comments:      Josette Tavares DO   2/1/2024   2:32 PM

## 2024-01-31 NOTE — PATIENT INSTRUCTIONS
[facility] UROGYNECOLOGY  [address] [address]  PH: [phone]  FAX: [phone]       Urodynamic Testing Discharge Instructions:    There are NO dietary or activity restrictions.  You may resume your normal schedule.      You may have mild discomfort for a few hours after your testing today.  There may be some mild burning when you urinate or you may see some blood in your urine.  These problems should not last more than 24 hours.  The following suggestions may minimize any symptoms you experience.    Drink 6-8 large glasses of water over the next 8 hours  A compress or sitz bath may be soothing  Tylenol or Ibuprofen may be taken as needed    If you experience any of the following, please call the office or, if after hours, the on-call physician at [phone].    Excessive pain  Bright red bloody discharge  Fever or chills  Continued urgency, frequency or burning with urination    Obtaining Test Results    Your urodynamic test will be interpreted by a specialist and available to the referring physician within 7-14 days.  Patients in our clinic are given an appointment to come back to discuss the results and any appropriate treatment recommendations.    Please do not hesitate to contact our office with any questions or concerns at [phone].    I acknowledge that I have received verbal and written discharge  instructions and that I understand these instructions clearly.    Patient Signature:    Date:

## 2024-02-26 ENCOUNTER — VIRTUAL PHONE E/M (OUTPATIENT)
Dept: UROLOGY | Facility: CLINIC | Age: 64
End: 2024-02-26
Attending: OBSTETRICS & GYNECOLOGY
Payer: MEDICARE

## 2024-02-26 DIAGNOSIS — N32.81 DETRUSOR INSTABILITY: Primary | ICD-10-CM

## 2024-02-26 DIAGNOSIS — N81.84 PELVIC MUSCLE WASTING: ICD-10-CM

## 2024-02-26 DIAGNOSIS — N95.2 POSTMENOPAUSAL ATROPHIC VAGINITIS: ICD-10-CM

## 2024-02-26 DIAGNOSIS — N39.3 FEMALE STRESS INCONTINENCE: ICD-10-CM

## 2024-02-26 DIAGNOSIS — R33.9 INCOMPLETE BLADDER EMPTYING: ICD-10-CM

## 2024-02-26 DIAGNOSIS — N39.41 URGE INCONTINENCE: ICD-10-CM

## 2024-02-26 DIAGNOSIS — N95.0 POSTMENOPAUSAL BLEEDING: ICD-10-CM

## 2024-02-26 NOTE — PROGRESS NOTES
Pt presents w/ initial c/o UI  Urodynamic testing undergone without complication.  Results reviewed with patient via telephone  Given circumstances surrounding COVID-19 this visit is being conducted as a televisit with pt's consent.  Pt in safe, private environment for televisit, provider located in the office setting    303/50cc & 323/103cc  senior care 246cc  DO @200cc  QUINN Yes@100mL reduced with cough in absence of DO     Discussed with patient mgmt options for incomplete bladder emptying, DO/UUI, QUINN  Biggest bother is UTI sx - pain, urinary urgency & frequency  Some QUINN, less bothersome  Rx'd PFPT in the past, hasn't done it  Renal ultrasound wnl recently  Bowels irreg, ester since colon surgery    Discussed incomplete bladder emptying - PT vs CISC vs indwelling catheter    Discussed dietary and behavioral modifications for mgmt of urinary symptoms  Discussed weight management and benefits of weight loss on urinary symptoms  Reviewed AUA/SUFU guidelines on mgmt of non-neurogenic OAB  Discussed pharmacologic and nonpharmacologic mgmt options of urinary symptoms - reviewed risks, benefits, alternatives, and goals of treatment  Discussed specific risks related to OAB meds including, but not limited to dry mouth, constipation, blurry vision, cognitive changes, and BP elevation.    Discussed management options for QUINN including expectant management, pelvic floor PT, pessary, and surgery  Discussed risks and benefits associated with each option  Discussed urodynamic testing & results    Bowel management reviewed. Discussed using fiber daily w/ addition of miralax as needed    Thorough discussion of surgical risks, benefits, and alternatives including, but not limited to bleeding/clots, infection, injury to nearby organs (urethra, bladder, ureters, bowel, blood vessels), dyspareunia, de edna UI, worsening UI, recurrence, voiding dysfunction, and pain.    Discussed management of pelvic organ prolapse including but not limited to  behavioral modifications, conservative options, and surgical management.   Discussed pessary management including benefits and risks. Discussed importance of keeping regularly scheduled pessary checks in prevention of complications related to pessary use.     All questions answered.  Pt will proceed PT (with void assessment & f/u)  HSC with D&C, poss polypectomy as scheduled    Follow up after PT w/ void assessment  Dr. Josette Tavares    Pre op packet provided

## 2024-02-26 NOTE — PATIENT INSTRUCTIONS
Name Address City Phone Contact   [facility] [address] [place] [phone]    [facility] [address] [place] [phone] Lisa [facility] [address] [place] [phone]    [facility] [address] [facility] [address] [place] [phone] Ricky Platt   [facility] 2151 [facility] [phone] Mary Chitra [facility] 1130 [facility] [phone]    [facility] 500 [facility] [phone] Faina Bustamante    [facility] 5 [facility] [phone] [facility] 2111 E [facility] [phone] Lynn Lucas   Rehabilitation Services at [facility] 245 [facility] [address] [place] [phone] Pilar [facility] 220 [facility] [address] [place] [phone]    [facility] for [facility] [address] [place] [phone] Milvia Bermeo   [facility] 1455 [facility] [phone] Ольга Yeboah   [facility] [address] [phone] Mary Howard   [facility] 0718 [facility] [phone] Emilee Álvarez Physical Therapy 676 [facility] 950 [place] [phone]    [facility] 4001 [facility] [address] [place] [phone] Jeannine VILLANUEVA Physical Therapy,  46[address] [facility] [address] [place] [phone] Payam Avila   [facility] [address] [phone] Roxane Chandra   Re[facility] Services at [facility] 200 [facility] [phone] Brittaney Estrada    [facility] 400 [facility] [phone]  Florina Chamberlain   [facility] [address] [place] [address] [phone] Ramon Cirilo   [facility] [address] [place] [phone] Carlee Ott   [facility] 1200 [facility] [phone] Maria Luisa [facility] 536 [facility] [address] [place] [phone]    [facility] 429 [facility] [phone] [facility]   [facility] 111 [facility] [phone] Arianna Jose   [facility] 528 [facility] [phone] [address] [phone] Sandra Guajardo   Rehabilitation Services at [facility] [address] [place] [phone] Jayshree [facility] 854 [facility] [address] [place] [phone]    [facility] [address] [place] [phone] Sushma Martinez   Rehabilitation Services at [facility] [address] [phone] Amarilis Grubbs   [facility] [address] [place] [address] [address] [place] [phone]    [facility] Therapy [address] Tremayne Nicole [phone] Roxane [facility] 40 [facility] [address] [phone]    [facility] 55790 [facility] [phone] Carlee Ott   [facility] 3080 [facility] [phone] Sylvia [facility] [address] [place] [phone]    [facility] Services [address] [facility] [address] [place] [phone] Sylvia Loomis   [facility] [address] [place]  [place] [phone] Neetu Padilla   [facility] Therapy [address] [facility] [phone]    [facility] Therapy 430 [facility]. [address] [place] [phone]    [facility] Therapy [place] 1019 [facility] [phone] Olivia Shane Lyons [place] [facility] [address] [place] [phone] Mara Lyons   [facility] [address] [place] [phone] [facility] Therapy [facility] 511 E [facility] [phone] Valeria Carpenter   [facility] [address] [facility] [address] [place] [phone] Heidy Jenkins   [facility] Therapy 640 [facility] [address] [place] [phone]    [facility] Medical [facility] Therapy [address] [place] [phone]    [facility] [place] [address] [place] [phone]    [facility] [place] [address] [facility] [address] [place] [phone]    Rehabilitation Services at [facility] 636 [facility] [address] [place] [phone] Selina Álvarez Physical Therapy 1 [facility] [address] [place] [phone]    [facility] [address] [place] [phone] Angelia Cummins   [facility] Therapy [address] [place] [phone] [facility] Physical Therapy [address] [place] [phone] [facility] 1875 [facility] [address] [place] [phone]    [facility] [address]. [facility] [phone] Jacki Ovalles Physical Therapy [facility] 828 [facility] [phone]    [facility] 310 [facility] [phone] Selina Trupti   [address] [phone] Angelia [facility] Physical Therapy [address] [place] [phone]    [facility] [place] [address] 2nd Floor [place] [phone]    [facility] 7339 [facility] [phone] [facility] Physical Therapy [facility] [address] [facility] [phone] Mindy [facility] 102 [facility] [address] [place] [phone]    Rehabilitation Services at [facility] 544 [facility] [phone] [facility]   Rehabilitation Services at [facility] 2900 [facility] [phone]  Diana Bonilla   [facility] 1820 [facility] [phone] Carina Ramirez   [facility] 710 [facility] [phone] Shellie Van   [facility] Physical Therapy 330 [facility] [phone] Molly Wiley   [facility] [place] 414 Division [facility] [phone] Haley King   [facility] [place] 2111 [facility] [place] [phone] Loulou De La O   [facility] [place] 94625 [facility] [phone] Patricia [facility] 43133 [facility] [phone]    [facility] 64711 [facility] [phone]    [facility] [address] [place] [phone]    [facility] [address] [place] [phone] Kelsy Savi [facility] [address] [place] [phone] Evelyn [facility] [address] [place] [phone]    [facility] Physical Therapy 7 [facility] [address] [place] [phone] Mady Crain   [facility] Re[facility] Services 25 [address]  [address] [place] [phone] Mady Crain   [facility] [address]  [facility] [place] [phone] Yeimy Lee   [facility] [address] [place] [phone]

## 2024-02-28 NOTE — PROGRESS NOTES
Susie Schulz is a 63 year old year old female who presents for a pre-operative physical exam.  Patient is to have hysteroscopy with dilation and curettage, possible polypectomy, to be done by Dr. Tavares at [facility] on 03/07/2024.    HPI:    63 y.o with hx of hyperlipidemia, HTN need medical clearance for surgery. Recent US showed mild thickening though the endometrium with uterine polyps.   Functional capacity: the patient denies any chest pain or shortness of breath with walking.  States she is able to walk up 2 flights of stairs carrying heavy groceries without difficulty, shortness of breath, or chest pain.  On review of her chart, she has had no recent coronary evaluation.   Today, she has no complaints.  Medications:   Current Outpatient Medications:     chlorhexidine gluconate 0.12 % Mouth/Throat Solution, FLOSS AND BRUSH TEETH THEN SWISH ACCORDING TO PACKAGING INSTRUCTION, Disp: , Rfl:     Fexofenadine HCl 30 MG Oral Tablet Dispersible, Take 1 tablet (30 mg total) by mouth daily., Disp: , Rfl:     estradiol (ESTRACE) 0.1 MG/GM Vaginal Cream, Apply 1/2 gram vaginally 2 times per week., Disp: 42.5 g, Rfl: 3    atorvastatin 20 MG Oral Tab, Take 1 tablet (20 mg total) by mouth nightly., Disp: 90 tablet, Rfl: 3    lisinopril 10 MG Oral Tab, Take 1 tablet (10 mg total) by mouth daily., Disp: 90 tablet, Rfl: 3    sulfamethoxazole-trimethoprim (BACTRIM) 400-80 MG Oral Tab, Take one tablet after intercourse as needed., Disp: 30 tablet, Rfl: 1    albuterol 108 (90 Base) MCG/ACT Inhalation Aero Soln, Inhale 2 puffs into the lungs every 6 (six) hours as needed for Wheezing or Shortness of Breath., Disp: 1 each, Rfl: 0    Cholecalciferol (VITAMIN D-3) 25 MCG (1000 UT) Oral Cap, , Disp: , Rfl:     Melatonin 10 MG Oral Tab, nightly., Disp: , Rfl:     Multiple Minerals-Vitamins (CALCIUM CITRATE PLUS/MAGNESIUM) Oral Tab, , Disp: , Rfl:     ketoconazole 2 % External Shampoo, , Disp: , Rfl:     Multiple  Vitamins-Minerals (HAIR SKIN AND NAILS FORMULA OR), Take 1 tablet by mouth daily., Disp: , Rfl:     Levocetirizine Dihydrochloride 5 MG Oral Tab, Take 1 tablet (5 mg total) by mouth every evening. Alternates with Allegra, Disp: , Rfl:     Multiple Vitamin (MULTI-VITAMIN DAILY) Oral Tab, Take by mouth daily., Disp: , Rfl:   Allergies: No Known Allergies  PMH:  has a past medical history of Abdominal pain (Unknown), Arrhythmia, Back pain (´94), Back problem, Bad breath (Unknown), Constipation (Unknown), Diverticulitis (2015), Diverticulosis, Essential hypertension, Flatulence/gas pain/belching (Unknown), Fracture, Headache disorder (Unknown), High blood pressure, High cholesterol (Unknown, ~ 5 or 6 yrs), adenomatous colonic polyps (10/21/2021), Hyperlipidemia, Irritable bowel syndrome, Osteoarthritis, Uncomfortable fullness after meals (Unknown), Visual impairment, and Wears glasses (Unknown).    She has no past medical history of Anesthesia complication, Difficult intubation, Family history of malignant hyperthermia, Family history of pseudocholinesterase deficiency, History of adverse reaction to anesthesia, motion sickness, Malignant hyperthermia, PONV (postoperative nausea and vomiting), or Pseudocholinesterase deficiency.  Surgical Hx:  has a past surgical history that includes back surgery; colonoscopy; sinus surgery  ; other surgical history; colonoscopy (2018); egd (10/19/2016); spine surgery procedure unlisted (´94, ´04, ´97); tonsillectomy (´81); Colectomy (2022); ; and endometrial ablation.  Family Hx: family history includes Breast Cancer in her sister; Breast Cancer (age of onset: 40) in her sister; Breast Cancer (age of onset: 80) in her maternal grandmother; Breast Cancer (age of onset: 81) in her mother; Colon Polyps in her father; Diabetes in her mother; Hypertension in her father, mother, sister, and sister; Stroke in her mother.  Social Hx:   Social History     Socioeconomic  History    Marital status:      Spouse name: Not on file    Number of children: Not on file    Years of education: Not on file    Highest education level: Not on file   Occupational History    Not on file   Tobacco Use    Smoking status: Never    Smokeless tobacco: Never   Vaping Use    Vaping Use: Never used   Substance and Sexual Activity    Alcohol use: Yes     Alcohol/week: 0.0 standard drinks of alcohol     Comment: Rare    Drug use: Never    Sexual activity: Not on file   Other Topics Concern    Caffeine Concern Not Asked    Exercise Not Asked    Seat Belt Not Asked    Special Diet Not Asked    Stress Concern Not Asked    Weight Concern Not Asked   Social History Narrative    Not on file     Social Determinants of Health     Financial Resource Strain: Not on file   Food Insecurity: Not on file   Transportation Needs: Not on file   Physical Activity: Not on file   Stress: Not on file   Social Connections: Not on file   Housing Stability: Not on file     REVIEW OF SYSTEMS:    GENERAL: feels well otherwise  SKIN: denies any unusual skin lesions  EYES:denies blurred vision or double vision  HEENT: denies nasal congestion, sinus pain or sore throat  LUNGS: denies shortness of breath with exertion. Denies history of sleep apnea or COPD  CARDIOVASCULAR: denies chest pain on exertion  GI: denies abdominal pain, denies heartburn  : denies dysuria, vaginal discharge or itching, denies nocturia or changes in stream  MUSCULOSKELETAL: denies back pain  NEURO: denies headaches, dizziness, fainting  PSYCH: denies depression or anxiety  HEMATOLOGIC: denies hx of anemia, bleeding or clotting disorders  ENDOCRINE: denies thyroid history or diabetes  ALL/ASTHMA: denies hx of allergy or asthma. Denies hx of adverse reaction to anesthesia or analgesics.  EXAM:    /84   Pulse 90   Temp 97.1 °F (36.2 °C)   Resp 18   Ht 5' 2.44\" (1.586 m)   Wt 189 lb (85.7 kg)   SpO2 96%   BMI 34.08 kg/m²   GENERAL: well  developed, well nourished, in no apparent distress  SKIN: no rashes, no suspicious lesions.  Warm and dry.  HEENT: atraumatic, normocephalic, ears and throat are clear  EYES: PERRLA, EOMI, normal optic disk, conjunctiva are clear  NECK: supple, no adenopathy, no bruits b/l  CHEST: no chest tenderness  LUNGS: clear to auscultation b/l  CARDIO: RRR without murmur  GI: good BS's, no masses, HSM or tenderness  MUSCULOSKELETAL: No CVA tenderness, FROM of the back  EXTREMITIES: no cyanosis, clubbing or edema  NEURO: Oriented times three, cranial nerves are intact, motor and sensory are grossly intact. DTRs +2 and symmetric b/l.    ASSESSMENT AND PLAN:    Susie Schulz is a 63 year old female who presents for cardiac risk assessment prior to her hysteroscopy with dilation and curettage, possible polypectomy, to be done by Dr. Tavares at [facility] on 03/07/2024.    Pt has the following conditions:   Patient Active Problem List   Diagnosis    Essential hypertension, benign    Mixed hyperlipidemia    Benign essential tremor    Hepatitis C antibody test negative      Pt has no significant history of cardiac or pulmonary conditions.  EKG performed today in the office shows normal sinus rhythm with 1st degree block.  No ST elevation, T wave changes, or evidence of LVH.     Pt stopped all her supplements a week ago.    The patient has a perioperative risk of major adverse cardiac event that is less than 1%.  she has no clinical markers and an exercise capacity of greater than 4 metabolic units.   Therefore according to ACC/AHA guidelines, she is therefore cleared for surgery with low cardiovascular risk.  Because of this, the patient is not a candidate for perioperative beta blockade.    This consult was sent back to the referring physician, Dr. Tavares.

## 2024-03-01 ENCOUNTER — LAB ENCOUNTER (OUTPATIENT)
Dept: LAB | Age: 64
End: 2024-03-01
Payer: MEDICARE

## 2024-03-01 ENCOUNTER — OFFICE VISIT (OUTPATIENT)
Dept: INTERNAL MEDICINE CLINIC | Facility: CLINIC | Age: 64
End: 2024-03-01
Payer: COMMERCIAL

## 2024-03-01 VITALS
SYSTOLIC BLOOD PRESSURE: 120 MMHG | OXYGEN SATURATION: 96 % | TEMPERATURE: 97 F | BODY MASS INDEX: 33.91 KG/M2 | WEIGHT: 189 LBS | HEART RATE: 90 BPM | HEIGHT: 62.44 IN | DIASTOLIC BLOOD PRESSURE: 84 MMHG | RESPIRATION RATE: 18 BRPM

## 2024-03-01 DIAGNOSIS — Z01.818 PREOP EXAM FOR INTERNAL MEDICINE: Primary | ICD-10-CM

## 2024-03-01 DIAGNOSIS — I10 ESSENTIAL HYPERTENSION, BENIGN: ICD-10-CM

## 2024-03-01 DIAGNOSIS — Z01.818 PREOP EXAM FOR INTERNAL MEDICINE: ICD-10-CM

## 2024-03-01 LAB
ANION GAP SERPL CALC-SCNC: 2 MMOL/L (ref 0–18)
ATRIAL RATE: 67 BPM
BASOPHILS # BLD AUTO: 0.05 X10(3) UL (ref 0–0.2)
BASOPHILS NFR BLD AUTO: 0.7 %
BUN BLD-MCNC: 14 MG/DL (ref 9–23)
CALCIUM BLD-MCNC: 9.7 MG/DL (ref 8.5–10.1)
CHLORIDE SERPL-SCNC: 107 MMOL/L (ref 98–112)
CO2 SERPL-SCNC: 28 MMOL/L (ref 21–32)
CREAT BLD-MCNC: 1.1 MG/DL
EGFRCR SERPLBLD CKD-EPI 2021: 56 ML/MIN/1.73M2 (ref 60–?)
EOSINOPHIL # BLD AUTO: 0 X10(3) UL (ref 0–0.7)
EOSINOPHIL NFR BLD AUTO: 0 %
ERYTHROCYTE [DISTWIDTH] IN BLOOD BY AUTOMATED COUNT: 12.9 %
FASTING STATUS PATIENT QL REPORTED: YES
GLUCOSE BLD-MCNC: 101 MG/DL (ref 70–99)
HCT VFR BLD AUTO: 45.3 %
HGB BLD-MCNC: 15.4 G/DL
IMM GRANULOCYTES # BLD AUTO: 0.02 X10(3) UL (ref 0–1)
IMM GRANULOCYTES NFR BLD: 0.3 %
LYMPHOCYTES # BLD AUTO: 1.52 X10(3) UL (ref 1–4)
LYMPHOCYTES NFR BLD AUTO: 22.1 %
MCH RBC QN AUTO: 31.9 PG (ref 26–34)
MCHC RBC AUTO-ENTMCNC: 34 G/DL (ref 31–37)
MCV RBC AUTO: 93.8 FL
MONOCYTES # BLD AUTO: 0.45 X10(3) UL (ref 0.1–1)
MONOCYTES NFR BLD AUTO: 6.5 %
NEUTROPHILS # BLD AUTO: 4.85 X10 (3) UL (ref 1.5–7.7)
NEUTROPHILS # BLD AUTO: 4.85 X10(3) UL (ref 1.5–7.7)
NEUTROPHILS NFR BLD AUTO: 70.4 %
OSMOLALITY SERPL CALC.SUM OF ELEC: 285 MOSM/KG (ref 275–295)
P AXIS: 33 DEGREES
P-R INTERVAL: 228 MS
PLATELET # BLD AUTO: 287 10(3)UL (ref 150–450)
POTASSIUM SERPL-SCNC: 4.7 MMOL/L (ref 3.5–5.1)
Q-T INTERVAL: 390 MS
QRS DURATION: 86 MS
QTC CALCULATION (BEZET): 412 MS
R AXIS: 48 DEGREES
RBC # BLD AUTO: 4.83 X10(6)UL
SODIUM SERPL-SCNC: 137 MMOL/L (ref 136–145)
T AXIS: 36 DEGREES
VENTRICULAR RATE: 67 BPM
WBC # BLD AUTO: 6.9 X10(3) UL (ref 4–11)

## 2024-03-01 PROCEDURE — 3008F BODY MASS INDEX DOCD: CPT

## 2024-03-01 PROCEDURE — 93000 ELECTROCARDIOGRAM COMPLETE: CPT

## 2024-03-01 PROCEDURE — 3074F SYST BP LT 130 MM HG: CPT

## 2024-03-01 PROCEDURE — 85025 COMPLETE CBC W/AUTO DIFF WBC: CPT

## 2024-03-01 PROCEDURE — 80048 BASIC METABOLIC PNL TOTAL CA: CPT

## 2024-03-01 PROCEDURE — 99214 OFFICE O/P EST MOD 30 MIN: CPT

## 2024-03-01 PROCEDURE — 36415 COLL VENOUS BLD VENIPUNCTURE: CPT

## 2024-03-01 PROCEDURE — 3079F DIAST BP 80-89 MM HG: CPT

## 2024-03-01 RX ORDER — CHLORHEXIDINE GLUCONATE ORAL RINSE 1.2 MG/ML
SOLUTION DENTAL
COMMUNITY
Start: 2023-12-22

## 2024-03-04 ENCOUNTER — DOCUMENTATION ONLY (OUTPATIENT)
Dept: INTERNAL MEDICINE CLINIC | Facility: CLINIC | Age: 64
End: 2024-03-04

## 2024-03-07 ENCOUNTER — HOSPITAL ENCOUNTER (OUTPATIENT)
Facility: HOSPITAL | Age: 64
Setting detail: HOSPITAL OUTPATIENT SURGERY
Discharge: HOME | End: 2024-03-07
Attending: OBSTETRICS & GYNECOLOGY | Admitting: OBSTETRICS & GYNECOLOGY
Payer: MEDICARE

## 2024-03-07 ENCOUNTER — ANESTHESIA EVENT (OUTPATIENT)
Dept: SURGERY | Facility: HOSPITAL | Age: 64
End: 2024-03-07
Payer: MEDICARE

## 2024-03-07 ENCOUNTER — ANESTHESIA (OUTPATIENT)
Dept: SURGERY | Facility: HOSPITAL | Age: 64
End: 2024-03-07
Payer: MEDICARE

## 2024-03-07 VITALS
WEIGHT: 189.38 LBS | OXYGEN SATURATION: 98 % | SYSTOLIC BLOOD PRESSURE: 150 MMHG | TEMPERATURE: 98 F | RESPIRATION RATE: 18 BRPM | BODY MASS INDEX: 34.85 KG/M2 | HEIGHT: 62 IN | HEART RATE: 65 BPM | DIASTOLIC BLOOD PRESSURE: 78 MMHG

## 2024-03-07 PROCEDURE — 0UB98ZX EXCISION OF UTERUS, VIA NATURAL OR ARTIFICIAL OPENING ENDOSCOPIC, DIAGNOSTIC: ICD-10-PCS | Performed by: OBSTETRICS & GYNECOLOGY

## 2024-03-07 PROCEDURE — 88305 TISSUE EXAM BY PATHOLOGIST: CPT | Performed by: OBSTETRICS & GYNECOLOGY

## 2024-03-07 RX ORDER — SODIUM CHLORIDE, SODIUM LACTATE, POTASSIUM CHLORIDE, CALCIUM CHLORIDE 600; 310; 30; 20 MG/100ML; MG/100ML; MG/100ML; MG/100ML
INJECTION, SOLUTION INTRAVENOUS CONTINUOUS
Status: DISCONTINUED | OUTPATIENT
Start: 2024-03-07 | End: 2024-03-07

## 2024-03-07 RX ORDER — ACETAMINOPHEN 500 MG
1000 TABLET ORAL ONCE
Status: DISCONTINUED | OUTPATIENT
Start: 2024-03-07 | End: 2024-03-07 | Stop reason: HOSPADM

## 2024-03-07 RX ORDER — HYDROCODONE BITARTRATE AND ACETAMINOPHEN 5; 325 MG/1; MG/1
1 TABLET ORAL ONCE AS NEEDED
Status: DISCONTINUED | OUTPATIENT
Start: 2024-03-07 | End: 2024-03-07

## 2024-03-07 RX ORDER — ONDANSETRON 2 MG/ML
4 INJECTION INTRAMUSCULAR; INTRAVENOUS EVERY 6 HOURS PRN
Status: DISCONTINUED | OUTPATIENT
Start: 2024-03-07 | End: 2024-03-07

## 2024-03-07 RX ORDER — KETOROLAC TROMETHAMINE 30 MG/ML
INJECTION, SOLUTION INTRAMUSCULAR; INTRAVENOUS AS NEEDED
Status: DISCONTINUED | OUTPATIENT
Start: 2024-03-07 | End: 2024-03-07 | Stop reason: SURG

## 2024-03-07 RX ORDER — NALOXONE HYDROCHLORIDE 0.4 MG/ML
0.08 INJECTION, SOLUTION INTRAMUSCULAR; INTRAVENOUS; SUBCUTANEOUS AS NEEDED
Status: DISCONTINUED | OUTPATIENT
Start: 2024-03-07 | End: 2024-03-07

## 2024-03-07 RX ORDER — ACETAMINOPHEN 500 MG
1000 TABLET ORAL ONCE AS NEEDED
Status: DISCONTINUED | OUTPATIENT
Start: 2024-03-07 | End: 2024-03-07

## 2024-03-07 RX ORDER — LIDOCAINE HYDROCHLORIDE 10 MG/ML
INJECTION, SOLUTION EPIDURAL; INFILTRATION; INTRACAUDAL; PERINEURAL AS NEEDED
Status: DISCONTINUED | OUTPATIENT
Start: 2024-03-07 | End: 2024-03-07 | Stop reason: SURG

## 2024-03-07 RX ORDER — SCOLOPAMINE TRANSDERMAL SYSTEM 1 MG/1
1 PATCH, EXTENDED RELEASE TRANSDERMAL ONCE
Status: DISCONTINUED | OUTPATIENT
Start: 2024-03-07 | End: 2024-03-07 | Stop reason: HOSPADM

## 2024-03-07 RX ORDER — HYDROCODONE BITARTRATE AND ACETAMINOPHEN 5; 325 MG/1; MG/1
2 TABLET ORAL ONCE AS NEEDED
Status: DISCONTINUED | OUTPATIENT
Start: 2024-03-07 | End: 2024-03-07

## 2024-03-07 RX ORDER — ONDANSETRON 2 MG/ML
INJECTION INTRAMUSCULAR; INTRAVENOUS AS NEEDED
Status: DISCONTINUED | OUTPATIENT
Start: 2024-03-07 | End: 2024-03-07 | Stop reason: SURG

## 2024-03-07 RX ORDER — DEXAMETHASONE SODIUM PHOSPHATE 4 MG/ML
VIAL (ML) INJECTION AS NEEDED
Status: DISCONTINUED | OUTPATIENT
Start: 2024-03-07 | End: 2024-03-07 | Stop reason: SURG

## 2024-03-07 RX ADMIN — LIDOCAINE HYDROCHLORIDE 50 MG: 10 INJECTION, SOLUTION EPIDURAL; INFILTRATION; INTRACAUDAL; PERINEURAL at 12:48:00

## 2024-03-07 RX ADMIN — KETOROLAC TROMETHAMINE 30 MG: 30 INJECTION, SOLUTION INTRAMUSCULAR; INTRAVENOUS at 12:54:00

## 2024-03-07 RX ADMIN — SODIUM CHLORIDE, SODIUM LACTATE, POTASSIUM CHLORIDE, CALCIUM CHLORIDE: 600; 310; 30; 20 INJECTION, SOLUTION INTRAVENOUS at 12:47:00

## 2024-03-07 RX ADMIN — SODIUM CHLORIDE, SODIUM LACTATE, POTASSIUM CHLORIDE, CALCIUM CHLORIDE: 600; 310; 30; 20 INJECTION, SOLUTION INTRAVENOUS at 13:04:00

## 2024-03-07 RX ADMIN — DEXAMETHASONE SODIUM PHOSPHATE 8 MG: 4 MG/ML VIAL (ML) INJECTION at 12:54:00

## 2024-03-07 RX ADMIN — ONDANSETRON 4 MG: 2 INJECTION INTRAMUSCULAR; INTRAVENOUS at 12:54:00

## 2024-03-07 NOTE — H&P
62 y/o female with thickened endometrium for surgical mgmt  Pre operative clearance with [facility] Oswald, pt seen & examined without changes.    Thorough discussion of surgical risks, benefits, and alternatives including, but not limited to bleeding/clots, infection, injury to nearby organs (urethra, bladder, ureters, bowel, blood vessels), dyspareunia, de edna UI, worsening UI, recurrence, voiding dysfunction, and pain.    Discussed pain mgmt and potential need for narcotics. Discussed addiction potential with narcotics. IL  reviewed.  Plan to proceed with [facility] with D&C, poss polypectomy as consented  All questions answered.   Josette Tavares  [phone]     Satisfactory

## 2024-03-07 NOTE — DISCHARGE INSTRUCTIONS
[facility]     Post-Operative Guidelines      AVOID CONSTIPATION - Take Miralax: one capful in water or juice each morning.  You can also take each evening if needed. Use milk of magnesia daily as needed to avoid straining with BMs    It is okay to walk up and down stairs and to walk outside, but be careful not to become fatigued.  Showers and tub baths are okay, even if you have a catheter or abdominal incision.  You may ride in a car immediately.      Please call us for any of the following:  Temperature above 100.5 for 4 hours or above 101.0 at any time  Chest pain or trouble breathing  Vaginal bleeding heavier than a period  Redness, tenderness or swelling of your legs  Pain or burning when you urinate  Redness, pain or foul discharge from incision    Office telephone, [phone], after hours [phone]    You received a drug called Toradol which is an Anti Inflammatory at: 12:55 PM  If you are allowed to take Anti inflammatories:    Do not take any Anti Inflammatory like Motrin, Aleve or Ibuprophen until after: 6:5 PM if needed. Always follow all label directions.   Please report any suspected allergic reactions or bleeding issues to your doctor

## 2024-03-07 NOTE — ANESTHESIA POSTPROCEDURE EVALUATION
[facility]    Susie Schulz Patient Status:  Hospital Outpatient Surgery   Age/Gender 63 year old female MRN [id number]   Location [facility] SURGERY Attending Josette Tavares DO   Hosp Day # 0 PCP Tani Calderon MD       Anesthesia Post-op Note    HYSTEROSCOPY WITH DILATION AND CURETTAGE POLYPECTOMY    Procedure Summary       Date: 03/07/24 Room / Location:  MAIN OR [address] [place] [address] MAIN OR    Anesthesia Start: 1243 Anesthesia Stop: 1311    Procedure: HYSTEROSCOPY WITH DILATION AND CURETTAGE POLYPECTOMY (Uterus) Diagnosis: (POSTMENOPAUSAL BLEEDING; THICKENED ENDOMETRIUM)    Surgeons: Josette Tavares DO Anesthesiologist: Nikunj Page MD    Anesthesia Type: general ASA Status: 2            Anesthesia Type: No value filed.    Vitals Value Taken Time   /80 03/07/24 1314   Temp 97.5 03/07/24 1314   Pulse 66 03/07/24 1314   Resp 18 03/07/24 1314   SpO2 95% 03/07/24 1314       Patient Location: Same Day Surgery    Anesthesia Type: MAC    Airway Patency: patent    Postop Pain Control: adequate    Mental Status: mildly sedated but able to meaningfully participate in the post-anesthesia evaluation    Nausea/Vomiting: none    Cardiopulmonary/Hydration status: stable euvolemic    Complications: no apparent anesthesia related complications    Postop vital signs: stable    Dental Exam: Unchanged from Preop    Patient to be discharged home when criteria met.

## 2024-03-07 NOTE — ANESTHESIA PREPROCEDURE EVALUATION
PRE-OP EVALUATION    Patient Name: Susie Schulz    Admit Diagnosis: POSTMENOPAUSAL BLEEDING; THICKENED ENDOMETRIUM    Pre-op Diagnosis: POSTMENOPAUSAL BLEEDING; THICKENED ENDOMETRIUM    HYSTEROSCOPY WITH DILATION AND CURETTAGE, POSSIBLE POLYPECTOMY    Anesthesia Procedure: HYSTEROSCOPY WITH DILATION AND CURETTAGE, POSSIBLE POLYPECTOMY    Surgeon(s) and Role:     * Josette Tavares, DO - Primary    Pre-op vitals reviewed.  Temp: 98.4 °F (36.9 °C)  Pulse: 66  Resp: 16  BP: 139/83  SpO2: 97 %  Body mass index is 34.15 kg/m².    Current medications reviewed.  Hospital Medications:   acetaminophen (Tylenol Extra Strength) tab 1,000 mg  1,000 mg Oral Once    scopolamine (Transderm-Scop) 1 MG/3DAYS patch 1 patch  1 patch Transdermal Once    lactated ringers infusion   Intravenous Continuous       Outpatient Medications:     Medications Prior to Admission   Medication Sig Dispense Refill Last Dose    chlorhexidine gluconate 0.12 % Mouth/Throat Solution FLOSS AND BRUSH TEETH THEN SWISH ACCORDING TO PACKAGING INSTRUCTION   3/7/2024    Fexofenadine HCl 30 MG Oral Tablet Dispersible Take 1 tablet (30 mg total) by mouth daily.   3/6/2024    estradiol (ESTRACE) 0.1 MG/GM Vaginal Cream Apply 1/2 gram vaginally 2 times per week. 42.5 g 3 Past Month    atorvastatin 20 MG Oral Tab Take 1 tablet (20 mg total) by mouth nightly. 90 tablet 3 Past Week    lisinopril 10 MG Oral Tab Take 1 tablet (10 mg total) by mouth daily. 90 tablet 3 3/7/2024    Cholecalciferol (VITAMIN D-3) 25 MCG (1000 UT) Oral Cap    Past Month    Melatonin 10 MG Oral Tab nightly.   Past Month    Multiple Minerals-Vitamins (CALCIUM CITRATE PLUS/MAGNESIUM) Oral Tab    Past Month    ketoconazole 2 % External Shampoo        Multiple Vitamins-Minerals (HAIR SKIN AND NAILS FORMULA OR) Take 1 tablet by mouth daily.   Past Month    Multiple Vitamin (MULTI-VITAMIN DAILY) Oral Tab Take by mouth daily.   Past Month    sulfamethoxazole-trimethoprim (BACTRIM) 400-80 MG Oral  Tab Take one tablet after intercourse as needed. 30 tablet 1 More than a month    albuterol 108 (90 Base) MCG/ACT Inhalation Aero Soln Inhale 2 puffs into the lungs every 6 (six) hours as needed for Wheezing or Shortness of Breath. 1 each 0 More than a month    Levocetirizine Dihydrochloride 5 MG Oral Tab Take 1 tablet (5 mg total) by mouth every evening. Alternates with Allegra   More than a month       Allergies: Patient has no known allergies.      Anesthesia Evaluation    Patient summary reviewed.    Anesthetic Complications  (-) history of anesthetic complications         GI/Hepatic/Renal                                 Cardiovascular                (+) obesity  (+) hypertension   (+) hyperlipidemia                                  Endo/Other                                  Pulmonary    Negative pulmonary ROS.                       Neuro/Psych                 (+) neuromuscular disease                     Past Surgical History:   Procedure Laterality Date    BACK SURGERY      x2    COLECTOMY  2022    COLONOSCOPY      COLONOSCOPY  2018    EGD  10/19/2016    At [place] (also had one at this location, date unknown)    ENDOMETRIAL ABLATION            OTHER SURGICAL HISTORY      uterine ablation    SINUS SURGERY        SPINE SURGERY PROCEDURE UNLISTED  ´94, ´04, ´97    TONSILLECTOMY  ´81    Tonsillectomy & Carter-Srinivas     Social History     Socioeconomic History    Marital status:    Tobacco Use    Smoking status: Never    Smokeless tobacco: Never   Vaping Use    Vaping Use: Never used   Substance and Sexual Activity    Alcohol use: Yes     Alcohol/week: 0.0 standard drinks of alcohol     Comment: Rare    Drug use: Never     History   Drug Use Unknown     Available pre-op labs reviewed.  Lab Results   Component Value Date    WBC 6.9 2024    RBC 4.83 2024    HGB 15.4 2024    HCT 45.3 2024    MCV 93.8 2024    MCH 31.9 2024    MCHC 34.0 2024    RDW 12.9  03/01/2024    .0 03/01/2024     Lab Results   Component Value Date     03/01/2024    K 4.7 03/01/2024     03/01/2024    CO2 28.0 03/01/2024    BUN 14 03/01/2024    CREATSERUM 1.10 (H) 03/01/2024     (H) 03/01/2024    CA 9.7 03/01/2024            Airway      Mallampati: II  Mouth opening: 3 FB  TM distance: 4 - 6 cm  Neck ROM: full Cardiovascular      Rhythm: regular  Rate: normal     Dental  Comment: No loose teeth reported by patient           Pulmonary      Breath sounds clear to auscultation bilaterally.               Other findings              ASA: 2   Plan: general  NPO status verified and patient meets guidelines.        Comment: Discussed general anesthesia with endotracheal tube/supraglottic airway with patient. Discussed risk of oral/dental trauma, nausea, rare allergic reactions. Patient understands the risks, benefits, and requirement for anesthesia and willing to proceed. Consent signed.       Plan/risks discussed with: patient                Present on Admission:  **None**

## 2024-03-07 NOTE — OPERATIVE REPORT
Pre Op: thickened endometrium   Post op: same + endometrial polyp     Procedure: hysteroscopy with dilation, curettage, and polpyectomy     Surgeon: AKSHAT Tavares DO     No complications     Findings: Polyps noted in endometrium Hemostasis upon completion.     Specimen: endometrial polyp & endometrial curettings     EBL: 5cc  UOP: 200cc     PROCEDURE:  The patient was taken to the operating room, with IV running after informed consent was obtained.    She was placed in the supine position and induced under MAC anesthesia.    The patient was then placed in the dorsal lithotomy position and prepped and draped in the usual sterile fashion.      bladder emptied  tenaculum was placed on anterior lip and cervix dilated to 6. Hysteroscope was introduced and polyp visualized  Bilateral fallopian tube ostia visualized. TruClear device used to clear endometrium of polyp and curettings collected  Hysteroscope removed  Several passes made with curet, smooth. Scant endometrial specimen removed with curettings. Curetting performed to gritty texture circumferentially.     180cc total fluid deficit  Tenaculum removed from cervix. Cervix & uterus reduced into vagina.      Specimen sent to pathology.     The patient was then removed from the dorsal lithotomy position, awakened, and transferred from the operating room to the same day surgery area in stable condition, having tolerated the procedure well.    Sponge, lap, & needle counts were correct.

## 2024-03-12 ENCOUNTER — TELEPHONE (OUTPATIENT)
Dept: UROLOGY | Facility: CLINIC | Age: 64
End: 2024-03-12

## 2024-03-12 NOTE — TELEPHONE ENCOUNTER
TC to patient to discuss pathology results from hysteroscopy with D&C  Discussed results with Dr. Edward     Final Diagnosis:   Endometrial curettings and polyp:  -Portions of benign endometrial polyp with fibrous stroma.  -No evidence of atypia, hyperplasia, or malignancy.  -Small fragments of benign endocervix.  -Strips of benign ectocervical squamous mucosa.     Discussed pathology with patient, benign findings   Patient verbalized understanding   Patient request to cancel appt on 3/26/24  Has follow up for PT for incomplete bladder emptying on 6/11/24 with Dr. Tavares  Patient agreed and had no further questions

## 2024-04-17 ENCOUNTER — TELEPHONE (OUTPATIENT)
Dept: PHYSICAL THERAPY | Facility: HOSPITAL | Age: 64
End: 2024-04-17

## 2024-04-22 ENCOUNTER — OFFICE VISIT (OUTPATIENT)
Dept: PHYSICAL THERAPY | Facility: HOSPITAL | Age: 64
End: 2024-04-22
Payer: MEDICARE

## 2024-04-22 DIAGNOSIS — R33.9 INCOMPLETE BLADDER EMPTYING: ICD-10-CM

## 2024-04-22 DIAGNOSIS — N81.84 PELVIC MUSCLE WASTING: ICD-10-CM

## 2024-04-22 DIAGNOSIS — R35.0 URINE FREQUENCY: Primary | ICD-10-CM

## 2024-04-22 PROCEDURE — 97112 NEUROMUSCULAR REEDUCATION: CPT

## 2024-04-22 PROCEDURE — 97162 PT EVAL MOD COMPLEX 30 MIN: CPT

## 2024-04-22 NOTE — PROGRESS NOTES
MUSCULOSKELETAL AND PELVIC FLOOR EVALUATION:     Diagnosis:     Incomplete bladder emptying (R33.9)  Pelvic muscle wasting (N81.84)  Female stress incontinence (N39.3)  Urge incontinence (N39.41)         Referring Provider: Betty Calderon  Date of Evaluation:    2024    Precautions:  None Next MD visit:   none scheduled  Date of Surgery: n/a     PATIENT SUMMARY   Susie Schulz is a 63 year old female  who presents to therapy today with complaints of leakage that recently increased after had bad cough.  Has had chronic bladder infections throughout the years and oftentimes has continued symptoms but negative cultures.  Current symptoms include: abdominal pain, vaginal pain, urgency/frequency, pressure, constipation, and leakage, back pain  Colon resection 2 years ago-bowel symptoms are worse in regards to constipation. Has to strain with bowel movements.Has a rectocele.    Pt describes pain level:pelvic: 0-2/10, back 0-5/10      Pregnant Now: No  Obstetrical/Gynecological history: : 3  Para: 3  Delivery method: vaginal  Occupation/Activities: retired, walking, back limiting  PFDI-20: 127.08/300    Susie describes prior level of function less leakage. Pt goals include no leakage-would like to not wear pad when going for a walk.  Past medical history was reviewed with Susie. Significant findings include  has a past medical history of Abdominal pain (Unknown), Arrhythmia, Back pain (´94), Back problem, Bad breath (Unknown), Constipation (Unknown), Diverticulitis (2015), Diverticulosis, Essential hypertension, Flatulence/gas pain/belching (Unknown), Fracture, Headache disorder (Unknown), High blood pressure, High cholesterol (Unknown, ~ 5 or 6 yrs), adenomatous colonic polyps (10/21/2021), Hyperlipidemia, Irritable bowel syndrome, Osteoarthritis, Uncomfortable fullness after meals (Unknown), Visual impairment, and Wears glasses (Unknown).   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 Tavares -usu was voiding every 3-4 hours  Urine Stream: normal  Amount: min  Leaking occurs: coughing, running, walking  Episodes of Leakage: 1 times per day-variable   Amount of leakage: small  Pad use: yes-depends on what she's doing  Pad Change frequency: PRN  Nocturia: 1x-usu drink a lot before going to bed  Fluid Intake: water  Bladder irritants: 1 -1/2 caff  Post void dribble: no  Hovering: no  Empty bladder just in case: yes  Do you ever leak urine without knowing it? no    BOWEL HABITS  Types of symptoms: Constipation and Incomplete emptying -splinting  Frequency of bowel movements: since bowel resection, has small amounts of bowel almost every time pt urinates  Stool consistency: [place] Stool Scale: 5  Do you strain with defecation: Yes   Laxative use: Yes  benefiber    SEXUAL HEALTH STATUS  Some times has pelvic pain and lower abdominal pain-does not follow any pattern    ASSESSMENT  Susie presents to physical therapy evaluation with primary c/o urinary leakage. The results of the objective tests and measures show decreased pelvic floor muscle strength and coordination, fair bowel and bladder habits, decreased core/LE strength, lumbar tightness .  Functional deficits include but are not limited to embarrassment with leakage .  Signs and symptoms are consistent with diagnosis of  Incomplete bladder emptying (R33.9)  Pelvic muscle wasting (N81.84), Female stress incontinence (N39.3), Urge incontinence (N39.41). Pt and PT discussed evaluation findings, pathology, POC and HEP.  Pt voiced understanding and performs HEP correctly  without reported pain. Skilled Pelvic Physical Therapy is medically necessary to address the above impairments and reach functional goals.    OBJECTIVE:   Posture: increased lumbar lordosis  Pelvic Alignment: L sacral rotation  Gait: pt ambulates on level ground with normal mechanics.     External Palpation: B lumbar paraspinals with increased tension  Scars (location/surgery): healed -lumbar region, central  Abdominal Wall Integrity: no restrictions     Range Of Motion  Lumbar AROM screen: wnl-decreased segmental mobility  LE AROM screen: grossly WNL     Strength (MMT) 5/5 SHANTELLE LE except R hip/LE 4-/5 L 4/5  Transverse Abdominis: 1/5    Flexibility Summary: WNL SHANTELLE LE     Special Tests  SLR negative    Informed consent for internal pelvic evaluation given: Yes    External Observation:   Voluntary contraction: present   Voluntary relaxation: present  Involuntary contraction: absent  Involuntary relaxation: present    Mons pubis: WNL  Labia majora: WNL  Labia minora: WNL  Urethral meatus: WNL  Introitus: WNL  Perineal body: WNL    Sensory/Reflex:  Vestibule: normal bilaterally  Anal [place]: Not Tested    Internal Examination   Scar: none    Pelvic Floor Muscle strength: (PERF= Power/Endurance/Reps/Fast) MMT: 2/3/3/6  External Anal Sphincter: nt  Accessory Muscle Use: gluteals, adductors, abdominals    Tissue Laxity Test:  Anterior Wall: WNL  Posterior Wall: Min  Apical: WNL    Eccentric lengthening contraction: wnl  Bearing down Valsalva maneuver (2-3x): + rectocele    Internal Palpation: WNL     Today's Treatment and Response:   Patient education was provided on objective findings of external and internal evaluation and expectations with treatment outcomes. Educated on pelvic anatomy and function with diagrams and pelvic model, bladder normatives, adequate hydration levels, proper toileting posture, instructed in bladder, bowel, and diet diary log and issued handout , stress/urge urinary incontinence strategies,  coordination of diaphragmatic breathing and pelvic floor contraction , and knack/pelvic muscle brace    Manual therapy/NM Re-ed- internal retraining pelvic floor muscles to learn to perform diaphragmatic breathing nikunj     Patient was instructed in and issued a HEP for: diet/bladder/bowel diary, diaphragmatic breathing x10, abdominal bracing x10, Kegel 10 repetitions 3x/day, 10 sec on/10 sec off, 2 sec on 2-4 sec off      Charges: PT Eval Moderate Complexity, NMR      Total Timed Treatment: 15 min     Total Treatment Time: 55 min     Based on clinical rationale and outcome measures, this evaluation involved Moderate Complexity decision making due to 1-2 personal factors/comorbidities, 3 body structures involved/activity limitations, and evolving symptoms including stress urinary incontinence, pelvic organ prolapse, and chronic back pain  PLAN OF CARE:    Goals: (to be met in 10 visits)  1. Independent in HEP and progression with improved understanding of bladder/bowel health, bladder retraining and long-term management.    2. Patient will demonstrate improved bladder voiding habits to voiding every 2 hours with less urinary leakage.  3. Patient will demonstrate improve PFM isolation, coordination and strength so she is able to reduce urinary urge and decrease leakage during ADL's.  4. Pt will have increased transverse abdominis muscle strength to 2/5 and hip strength to 4+/5 to assist with supporting pelvic floor muscles.   5. PFM contraction before increase intra-abdominal pressure.      Frequency / Duration: Patient will be seen for 1 x/week or a total of 10 visits over a 90 day period.  Treatment will include: Manual Therapy, Neuromuscular Re-education, Self-Care Home Management, Therapeutic Activities, Therapeutic Exercise, and Home Exercise Program instruction     Education or treatment limitation: None  Rehab Potential:good      Patient/Family/Caregiver was advised of these findings, precautions, and  treatment options and has agreed to actively participate in planning and for this course of care.    Thank you for your referral. Please co-sign or sign and return this letter via fax as soon as possible to [phone]. If you have any questions, please contact me at Dept: [phone]    Sincerely,  Electronically signed by therapist: Faina Forrest PT  Physician's certification required: Yes  I certify the need for these services furnished under this plan of treatment and while under my care.    X___________________________________________________ Date____________________    Certification From: 4/22/2024  To:7/21/2024

## 2024-05-01 ENCOUNTER — OFFICE VISIT (OUTPATIENT)
Dept: PHYSICAL THERAPY | Facility: HOSPITAL | Age: 64
End: 2024-05-01
Payer: MEDICARE

## 2024-05-01 PROCEDURE — 97112 NEUROMUSCULAR REEDUCATION: CPT

## 2024-05-01 PROCEDURE — 97110 THERAPEUTIC EXERCISES: CPT

## 2024-05-01 NOTE — PROGRESS NOTES
Diagnosis:   Incomplete bladder emptying (R33.9)  Pelvic muscle wasting (N81.84)  Female stress incontinence (N39.3)  Urge incontinence (N39.41)      Referring Provider: Betty Musa  Date of Evaluation:    4/22/2024    Precautions:    History back pain/2 surgeries  Colon resection    Current symptoms include: abdominal pain, vaginal pain, urgency/frequency, pressure, constipation, and leakage, back pain Next MD visit:   none scheduled  Date of Surgery: n/a   Insurance Primary/Secondary: [employer] / N/A     # Auth Visits: no auth, no limit            Subjective:   Doing kegel's w diaphragmatic breathing .    PFDI-20: 127.08/300-Initial evaluation    Pain: 0/10      Objective: Tx flow sheet     Initial evaluation:   URINARY HABITS  Types of symptoms: stress incontinence and nocturia  Events associated with the onset of urinary complaints: 1 year and then increased with cough in June 2023  Abdominal/Vaginal Pressure complaints: yes, rectocele  Empty bladder just in case: yes    BOWEL HABITS  Types of symptoms: Constipation and Incomplete emptying -splinting  Frequency of bowel movements: since bowel resection, has small amounts of bowel almost every time pt urinates  Stool consistency: [place] Stool Scale: 5  Do you strain with defecation: Yes     Strength (MMT) 5/5 SHANTELLE LE except R hip/LE 4-/5 L 4/5  Transverse Abdominis: 1/5    Informed consent for internal pelvic evaluation given: Yes    External Observation:   Involuntary contraction: absent    Internal Examination     Pelvic Floor Muscle strength: (PERF= Power/Endurance/Reps/Fast) MMT: 2/3/3/6  Accessory Muscle Use: gluteals, adductors, abdominals    Tissue Laxity Test:  Posterior Wall: Min  Bearing down Valsalva maneuver (2-3x): + rectocele    Internal Palpation: WNL     Assessment:   Improved coordination pelvic floor muscles via bladder retraining and addition of kegel with diaphragmatic breathing , abdominal bracing and fascially connected mm.  Pt had no back pain throughout treatment but did have L hamstring cramp after 3 bridges. Hamstring tenderness resolved with rest, stretch and min soft tissue mobilization .   Pt appears motivated to perform upgraded HEP that was issued .    Goals:   Goals: (to be met in 10 visits)  1. Independent in HEP and progression with improved understanding of bladder/bowel health, bladder retraining and long-term management.    2. Patient will demonstrate improved bladder voiding habits to voiding every 2 hours with less urinary leakage.  3. Patient will demonstrate improve PFM isolation, coordination and strength so she is able to reduce urinary urge and decrease leakage during ADL's.  4. Pt will have increased transverse abdominis muscle strength to 2/5 and hip strength to 4+/5 to assist with supporting pelvic floor muscles.   5. PFM contraction before increase intra-abdominal pressure.    Plan: Continue plan of care as pt tolerates with focus on pelvic floor muscles. Next visit: biofeedback   Date: 5/1/2024  TX#: 2/10 Date:                 TX#: 3/ Date:                 TX#: 4/ Date:                 TX#: 5/ Date:   Tx#: 6/   PFM NM  Re-ed     Subj assessment      Katiana/phys pelvic floor muscles /diaphragm /Transverse abdominis /fascially connected mm    Bladder diary    Bladder fitness+ handout    Bladder irritatns + handout    Constipation recipe  Bowel voiding       biofeedback       Ther Ex:   Kegel  diaphragmatic breathing   abdominal bracing     Kegel +  iso hip add  10\" x10     Kegel + articulating bridge x3    Kegel +  Resisted   ER RTB  x10     Issued HEP for above with RTB    L hamstring stretch x30 sec        STM L hamstring-min           HEP: Kegel 10 repetitions 3x/day, 10 sec on/10 sec off, 2 sec on 2-4 sec off , diaphragmatic breathing x10, abdominal bracing x10, + additions above     Charges: NMR2 30  TE 15       Total Timed Treatment: 45 min  Total Treatment Time: 45 min

## 2024-05-08 ENCOUNTER — OFFICE VISIT (OUTPATIENT)
Dept: PHYSICAL THERAPY | Facility: HOSPITAL | Age: 64
End: 2024-05-08
Payer: MEDICARE

## 2024-05-08 PROCEDURE — 97112 NEUROMUSCULAR REEDUCATION: CPT

## 2024-05-08 PROCEDURE — 97110 THERAPEUTIC EXERCISES: CPT

## 2024-05-08 NOTE — PROGRESS NOTES
Diagnosis:   Incomplete bladder emptying (R33.9)  Pelvic muscle wasting (N81.84)  Female stress incontinence (N39.3)  Urge incontinence (N39.41)      Referring Provider: Betty Musa  Date of Evaluation:    4/22/2024    Precautions:    History back pain/2 surgeries  Colon resection    Current symptoms include: abdominal pain, vaginal pain, urgency/frequency, pressure, constipation, and leakage, back pain Next MD visit:   none scheduled  Date of Surgery: n/a   Insurance Primary/Secondary: [employer]IC[employer] / N/A     # Auth Visits: no auth, no limit            Subjective:   Doing kegel's w diaphragmatic breathing .    PFDI-20: 127.08/300-Initial evaluation    Pain: 0/10      Objective: Tx flow sheet   5/8/2024   Biofeedback: Recruitment good, holding ability good phasic, fair tonic, derecruitment fair, baseline between contractions .5-1.5Ua, baseline resting average flatlinea (normal 2.0Ua)      Initial evaluation:   URINARY HABITS  Types of symptoms: stress incontinence and nocturia  Events associated with the onset of urinary complaints: 1 year and then increased with cough in June 2023  Abdominal/Vaginal Pressure complaints: yes, rectocele  Empty bladder just in case: yes    BOWEL HABITS  Types of symptoms: Constipation and Incomplete emptying -splinting  Frequency of bowel movements: since bowel resection, has small amounts of bowel almost every time pt urinates  Stool consistency: [place] Stool Scale: 5  Do you strain with defecation: Yes     Strength (MMT) 5/5 SHANTELLE LE except R hip/LE 4-/5 L 4/5  Transverse Abdominis: 1/5    Informed consent for internal pelvic evaluation given: Yes    External Observation:   Involuntary contraction: absent    Internal Examination     Pelvic Floor Muscle strength: (PERF= Power/Endurance/Reps/Fast) MMT: 2/3/3/6  Accessory Muscle Use: gluteals, adductors, abdominals    Tissue Laxity Test:  Posterior Wall: Min  Bearing down Valsalva maneuver (2-3x): + rectocele    Internal  Palpation: WNL     Assessment:   Improved coordination pelvic floor muscles via biofeedback in various planes of motion.       Goals:   Goals: (to be met in 10 visits)  1. Independent in HEP and progression with improved understanding of bladder/bowel health, bladder retraining and long-term management.    2. Patient will demonstrate improved bladder voiding habits to voiding every 2 hours with less urinary leakage.  3. Patient will demonstrate improve PFM isolation, coordination and strength so she is able to reduce urinary urge and decrease leakage during ADL's.  4. Pt will have increased transverse abdominis muscle strength to 2/5 and hip strength to 4+/5 to assist with supporting pelvic floor muscles.   5. PFM contraction before increase intra-abdominal pressure.    Plan: Continue plan of care as pt tolerates with focus on pelvic floor muscles. Next visit: nikunj w fascially connected mm and simulated activities of daily living   Date: 5/1/2024  TX#: 2/10 Date:  5/8/2024                TX#: 3/ Date:                 TX#: 4/ Date:                 TX#: 5/ Date:   Tx#: 6/   PFM NM  Re-ed     Subj assessment      Katiana/phys pelvic floor muscles /diaphragm /Transverse abdominis /fascially connected mm    Bladder diary    Bladder fitness+ handout    Bladder irritatns + handout    Constipation recipe  Bowel voiding     NMR/TE  biofeedback -  External  sensors were placed and leads were attached  Resting tone  Tonic  Phasic  [place]  With coordinated breathing    kegel +  *Sit  *March  *Stand  *sit<>stand    Biofeedback with constant interpretation of results.     physiological quieting     Reviewed HEP        Ther Ex:   Kegel  diaphragmatic breathing   abdominal bracing     Kegel +  iso hip add  10\" x10     Kegel + articulating bridge x3    Kegel +  Resisted   ER RTB  x10     Issued HEP for above with RTB    L hamstring stretch x30 sec        STM L hamstring-min     Ther Ex:   kegel  diaphragmatic breathing   abdominal  bracing       HEP: Kegel 10 repetitions 3x/day, 10 sec on/10 sec off, 2 sec on 2-4 sec off , diaphragmatic breathing x10, abdominal bracing x10, + additions above     Charges: [facility]2 30  TE 15       Total Timed Treatment: 45 min  Total Treatment Time: 45 min

## 2024-05-15 ENCOUNTER — OFFICE VISIT (OUTPATIENT)
Dept: PHYSICAL THERAPY | Facility: HOSPITAL | Age: 64
End: 2024-05-15

## 2024-05-15 PROCEDURE — 97110 THERAPEUTIC EXERCISES: CPT

## 2024-05-15 PROCEDURE — 97112 NEUROMUSCULAR REEDUCATION: CPT

## 2024-05-15 NOTE — PROGRESS NOTES
Diagnosis:   Incomplete bladder emptying (R33.9)  Pelvic muscle wasting (N81.84)  Female stress incontinence (N39.3)  Urge incontinence (N39.41)      Referring Provider: Betty Musa  Date of Evaluation:    4/22/2024    Precautions:    History back pain/2 surgeries  Colon resection    Current symptoms include: abdominal pain, vaginal pain, urgency/frequency, pressure, constipation, and leakage, back pain Next MD visit:   none scheduled  Date of Surgery: n/a   Insurance Primary/Secondary: [employer] / N/A     # Auth Visits: no auth, no limit            Subjective:   Not wearing pad as much.   Went on a walk wearing pad and had no leakage. Overall less leakage.  Feels pressure in anterior pelvic floor area.  Doing kegel's w diaphragmatic breathing .    PFDI-20: 127.08/300-Initial evaluation    Pain: 0/10      Objective: Tx flow sheet   5/15/2024   Observed chest breathing  5/8/2024   Biofeedback: Recruitment good, holding ability good phasic, fair tonic, derecruitment fair, baseline between contractions .5-1.5Ua, baseline resting average flatlinea (normal 2.0Ua)      Initial evaluation:   URINARY HABITS  Types of symptoms: stress incontinence and nocturia  Events associated with the onset of urinary complaints: 1 year and then increased with cough in June 2023  Abdominal/Vaginal Pressure complaints: yes, rectocele  Empty bladder just in case: yes    BOWEL HABITS  Types of symptoms: Constipation and Incomplete emptying -splinting  Frequency of bowel movements: since bowel resection, has small amounts of bowel almost every time pt urinates  Stool consistency: [place] Stool Scale: 5  Do you strain with defecation: Yes     Strength (MMT) 5/5 SHANTELLE LE except R hip/LE 4-/5 L 4/5  Transverse Abdominis: 1/5    Informed consent for internal pelvic evaluation given: Yes    External Observation:   Involuntary contraction: absent    Internal Examination     Pelvic Floor Muscle strength: (PERF=  Power/Endurance/Reps/Fast) MMT: 2/3/3/6  Accessory Muscle Use: gluteals, adductors, abdominals    Tissue Laxity Test:  Posterior Wall: Min  Bearing down Valsalva maneuver (2-3x): + rectocele    Internal Palpation: WNL     Assessment:   Improved coordination pelvic floor muscles via simulated activities of daily living .   Improved diaphragmatic breathing with verbal and manual cueing.   Pt having less bladder issues.      Goals:   Goals: (to be met in 10 visits)  1. Independent in HEP and progression with improved understanding of bladder/bowel health, bladder retraining and long-term management.    2. Patient will demonstrate improved bladder voiding habits to voiding every 2 hours with less urinary leakage.  3. Patient will demonstrate improve PFM isolation, coordination and strength so she is able to reduce urinary urge and decrease leakage during ADL's.  4. Pt will have increased transverse abdominis muscle strength to 2/5 and hip strength to 4+/5 to assist with supporting pelvic floor muscles.   5. PFM contraction before increase intra-abdominal pressure.    Plan: Continue plan of care as pt tolerates with focus on pelvic floor muscles. Next visit: nikunj w fascially connected mm and simulated activities of daily living   Date: 5/1/2024  TX#: 2/10 Date:  5/8/2024                TX#: 3/ Date:       5/15/2024           TX#: 4/ Date:                 TX#: 5/ Date:   Tx#: 6/   PFM NM  Re-ed     Subj assessment      Katiana/phys pelvic floor muscles /diaphragm /Transverse abdominis /fascially connected mm    Bladder diary    Bladder fitness+ handout    Bladder irritatns + handout    Constipation recipe  Bowel voiding     PFM NMR  biofeedback -  External  sensors were placed and leads were attached  Resting tone  Tonic  Phasic  [place]  With coordinated breathing    nikunj +  *Sit  *March  *Stand  *sit<>stand    Biofeedback with constant interpretation of results.     physiological quieting     Reviewed HEP   PFM  NMR  Katiana/phys pelvic floor muscles /diaphragm /Transverse abdominis /fascially connected mm  Coordination brain/SNS/bladder/PFM  Pressure management                       Ther Ex:   Kegel  diaphragmatic breathing   abdominal bracing     Kegel +  iso hip add  10\" x10     Kegel + articulating bridge x3    Kegel +  Resisted   ER RTB  x10     Issued HEP for above with RTB    L hamstring stretch x30 sec        STM L hamstring-min     Ther Ex:   kegel  diaphragmatic breathing   abdominal bracing  Ther Ex:   diaphragmatic breathing   abdominal bracing   kegel    Kegel +  iso hip add  10\" x10    Kegel + articulating bridge x10      Kegel +  Resisted   ER RTB  x10     Kegel + clamshell RTB x10 R/L     Sit on ball-  Tonic  Phasic  Kegel's    Sit on ball-  Kegel +  U/LE lift  x10     Shuttle- DLP  25# with pelvic brace + iso hip adduction x20 reps    deadLift 5#  with coordinated breathing 2x10    Airex rhomberg kegel's x1'    Kegel w walking       HEP: Kegel 10 repetitions 3x/day, 10 sec on/10 sec off, 2 sec on 2-4 sec off , diaphragmatic breathing x10, abdominal bracing x10, + additions above     Charges: NMR 15  TE2  30       Total Timed Treatment: 45 min  Total Treatment Time: 45 min

## 2024-06-05 ENCOUNTER — OFFICE VISIT (OUTPATIENT)
Dept: PHYSICAL THERAPY | Facility: HOSPITAL | Age: 64
End: 2024-06-05
Payer: MEDICARE

## 2024-06-05 PROCEDURE — 97110 THERAPEUTIC EXERCISES: CPT

## 2024-06-05 PROCEDURE — 97112 NEUROMUSCULAR REEDUCATION: CPT

## 2024-06-05 NOTE — PROGRESS NOTES
Diagnosis:   Incomplete bladder emptying (R33.9)  Pelvic muscle wasting (N81.84)  Female stress incontinence (N39.3)  Urge incontinence (N39.41)      Referring Provider: Betty Musa  Date of Evaluation:    4/22/2024    Precautions:    History back pain/2 surgeries  Colon resection    Current symptoms include: abdominal pain, vaginal pain, urgency/frequency, pressure, constipation, and leakage, back pain Next MD visit:   none scheduled  Date of Surgery: n/a   Insurance Primary/Secondary: [employer] / N/A     # Auth Visits: no auth, no limit            DISCHARGE SUMMARY  PT SEEN 5X'S IN PHYSICAL THERAPY    Subjective:   Went on vacation and didn't have any leakage.  Incorporated kegel's with walking.  Went on a walk wearing pad and had no leakage.   No pressure in anterior pelvic floor area anymore.  Doing kegel's w diaphragmatic breathing .  Has to splint some times for bowel movements but not straining as much.   No fecal incontinence.  Feels comfortable doing her own HEP.    PFDI-20: 31.25/300 (was:127.08/300)    Pain: 0/10      Objective: Tx flow sheet     5/8/2024   Biofeedback: Recruitment good, holding ability good phasic, fair tonic, derecruitment fair, baseline between contractions .5-1.5Ua, baseline resting average flatlinea (normal 2.0Ua)      URINARY HABITS  stress incontinence-RESOLVED  Abdominal/Vaginal Pressure complaints: NO (WAS: yes, rectocele)  Empty bladder just in case: yes    BOWEL HABITS  Types of symptoms: Constipation and Incomplete emptying -splinting-IMPROVED    Frequency of bowel movements: since bowel resection, has small amounts of bowel almost every time pt urinates  Stool consistency: [place] Stool Scale: 5  Do you strain with defecation: NOT AS MUCH NOW -HAS TO SPLINT SOME TIMES (WAS:Yes)     Strength (MMT) 5/5 SHANTELLE LE  Transverse Abdominis: 2/5 (was:1/5)    Informed consent for internal pelvic evaluation given: Yes    External Observation:   Involuntary contraction:  absent    Internal Examination     Pelvic Floor Muscle strength: (PERF= Power/Endurance/Reps/Fast) MMT: 2/3/3/6-NOT RE-ASSESSED   Accessory Muscle Use: gluteals, adductors, abdominals-ABLE TO ISOLATE PELVIC FLOOR MUSCLES    Tissue Laxity Test:  Posterior Wall: Min  Bearing down Valsalva maneuver (2-3x): + rectocele    Internal Palpation: WNL     Assessment:   Pt has made significant improvement in physical therapy with resolution of leakage, increased core and hip strength, improved coordination bladder and bowel and function with performing activities of daily living without prolapse feeling.  All goals met.  Pt motivated to continue HEP.      95.83 POINT IMPROVEMENT PFDI      Goals:   Goals: (to be met in 10 visits)  1. Independent in HEP and progression with improved understanding of bladder/bowel health, bladder retraining and long-term management.  -MET  2. Patient will demonstrate improved bladder voiding habits to voiding every 2 hours with less urinary leakage.-MET  3. Patient will demonstrate improve PFM isolation, coordination and strength so she is able to reduce urinary urge and decrease leakage during ADL's.-MET  4. Pt will have increased transverse abdominis muscle strength to 2/5 and hip strength to 4+/5 to assist with supporting pelvic floor muscles. -MET  5. PFM contraction before increase intra-abdominal pressure.-MET    Plan: Pt considered discharged from physical therapy.  Pt instructed to call clinic if he/she has any further questions and to continue home exercise program.   Date: 5/1/2024  TX#: 2/10 Date:  5/8/2024                TX#: 3/ Date:       5/15/2024           TX#: 4/ Date:               6/5/2024   TX#: 5/   PFM NM  Re-ed     Subj assessment      Katiana/phys pelvic floor muscles /diaphragm /Transverse abdominis /fascially connected mm    Bladder diary    Bladder fitness+ handout    Bladder irritatns + handout    Constipation recipe  Bowel voiding     PFM NMR  biofeedback -  External   sensors were placed and leads were attached  Resting tone  Tonic  Phasic  [place]  With coordinated breathing    kegel +  *Sit  *March  *Stand  *sit<>stand    Biofeedback with constant interpretation of results.     physiological quieting     Reviewed HEP   PFM NMR  Katiana/phys pelvic floor muscles /diaphragm /Transverse abdominis /fascially connected mm  Coordination brain/SNS/bladder/PFM  Pressure management                   PFM NMR  PFDI scoring and interpretion with patient strategies to reduce impairments.   Bladder/bowel strategies  Constipation strategies  toileting  Katiana/phys pelvic floor muscles /diaphragm /Transverse abdominis /fascially connected mm  Coordination brain/SNS/bladder/PFM  Pressure management  prolapse do's and dont's    Ther Ex:   Kegel  diaphragmatic breathing   abdominal bracing     Kegel +  iso hip add  10\" x10     Kegel + articulating bridge x3    Kegel +  Resisted   ER RTB  x10     Issued HEP for above with RTB    L hamstring stretch x30 sec        STM L hamstring-min     Ther Ex:   kegel  diaphragmatic breathing   abdominal bracing  Ther Ex:   diaphragmatic breathing   abdominal bracing   kegel    Kegel +  iso hip add  10\" x10    Kegel + articulating bridge x10      Kegel +  Resisted   ER RTB  x10     Kegel + clamshell RTB x10 R/L     Sit on ball-  Tonic  Phasic  Kegel's    Sit on ball-  Kegel +  U/LE lift  x10     Shuttle- DLP  25# with pelvic brace + iso hip adduction x20 reps    deadLift 5#  with coordinated breathing 2x10    Airex rhomberg kegel's x1'    Kegel w walking   Ther Ex:   Progress note     Reviewed HEP with upgrades    diaphragmatic breathing   abdominal bracing   Kegel       HEP: Kegel 10 repetitions 3x/day, 10 sec on/10 sec off, 2 sec on 2-4 sec off , diaphragmatic breathing x10, abdominal bracing x10, + additions above     Charges: NM2 30  TE 15       Total Timed Treatment: 45 min  Total Treatment Time: 45 min

## 2024-06-11 ENCOUNTER — OFFICE VISIT (OUTPATIENT)
Dept: UROLOGY | Facility: CLINIC | Age: 64
End: 2024-06-11
Attending: OBSTETRICS & GYNECOLOGY
Payer: MEDICARE

## 2024-06-11 VITALS
BODY MASS INDEX: 35 KG/M2 | DIASTOLIC BLOOD PRESSURE: 70 MMHG | WEIGHT: 192 LBS | SYSTOLIC BLOOD PRESSURE: 140 MMHG | TEMPERATURE: 98 F

## 2024-06-11 DIAGNOSIS — R33.9 INCOMPLETE BLADDER EMPTYING: ICD-10-CM

## 2024-06-11 DIAGNOSIS — N81.84 PELVIC MUSCLE WASTING: ICD-10-CM

## 2024-06-11 DIAGNOSIS — N39.3 STRESS INCONTINENCE, FEMALE: Primary | ICD-10-CM

## 2024-06-11 DIAGNOSIS — N81.6 RECTOCELE: ICD-10-CM

## 2024-06-11 DIAGNOSIS — N95.2 POSTMENOPAUSAL ATROPHIC VAGINITIS: ICD-10-CM

## 2024-06-11 DIAGNOSIS — N32.81 DETRUSOR INSTABILITY: ICD-10-CM

## 2024-06-11 DIAGNOSIS — N95.0 POSTMENOPAUSAL BLEEDING: ICD-10-CM

## 2024-06-11 PROCEDURE — 87086 URINE CULTURE/COLONY COUNT: CPT | Performed by: OBSTETRICS & GYNECOLOGY

## 2024-06-11 PROCEDURE — 99212 OFFICE O/P EST SF 10 MIN: CPT

## 2024-06-11 NOTE — PROGRESS NOTES
Patient presents to follow up voids    She is currently using PFPT    She reports +improvement     Bowels reg  No UTIs  Vag estrogen  happy    /70   Temp 98 °F (36.7 °C)   Wt 192 lb (87.1 kg)   BMI 34.62 kg/m²     GEN: NAD  CV: RRR  Pulm: nl effort  Abd: soft    PELVIC EXAM:  Ext. Gen: some atrophy, no lesions  Urethra: +atrophy, nontender  Bladder:some fullness, nontender  Vagina: ++atrophy  Cervix: no bleeding, no lesions, nontender  Uterus: +mobile  Adnexa:no masses, nontender  Perineum: nontender  Anus: wnl  Rectum: defer     PELVIS FLOOR NEUROMUSCULAR FUNCTION:  Strength:  1 and Unable to hold greater than 3 sec  Perineal Sensation:  Normal        PELVIC SUPPORT:  [place]:  0  Ant:  0  Post:  2  CST:  negative  UVJ: somewhat hypermobile    Voided in BR 50cc  Verbal consent obtained  Sterile technique used to empty bladder, specimen sent (~30cc)    Impression/Plan:    ICD-10-CM    1. Stress incontinence, female  N39.3 Urine Culture, Routine      2. 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 Incomplete bladder emptying  R33.9       4. Postmenopausal bleeding  N95.0       5. Pelvic muscle wasting  N81.84       6. Postmenopausal atrophic vaginitis  N95.2       7. Rectocele  N81.6           Discussion Items:     Discussed mgmt of vulvovaginal atrophy with vaginal estrogen cream. Reviewed associated benefits, risks, alternatives, and goals. Recommend low dose twice weekly mgmt     Bowel management reviewed. Discussed using fiber daily w/ addition of miralax as needed    Cont pelvic exercises  Call with s/sx of UTI    All questions answered  She understands and agrees to plan    Return in about 1 year (around 6/11/2025) for sooner prn.    Josette Tavares, DO, FACOG, FACS    The 21st Century Cures Act makes medical notes like these available to patients in the interest of transparency. However, be advised this is a medical document. It is intended as peer to peer communication. It is written in medical language and  may contain abbreviations or verbiage that are unfamiliar. It may appear blunt or direct. Medical documents are intended to carry relevant information, facts as evident, and the clinical opinion of the practitioner.

## 2024-06-12 ENCOUNTER — TELEPHONE (OUTPATIENT)
Dept: PHYSICAL THERAPY | Facility: HOSPITAL | Age: 64
End: 2024-06-12

## 2024-06-12 ENCOUNTER — APPOINTMENT (OUTPATIENT)
Dept: PHYSICAL THERAPY | Facility: HOSPITAL | Age: 64
End: 2024-06-12
Payer: MEDICARE

## 2024-06-12 NOTE — TELEPHONE ENCOUNTER
LMOM regarding no show in physical therapy today. Left date and time of next appt and # to call  if she is unable to make that appt.

## 2024-06-14 ENCOUNTER — TELEPHONE (OUTPATIENT)
Dept: UROLOGY | Facility: CLINIC | Age: 64
End: 2024-06-14

## 2024-06-17 ENCOUNTER — TELEPHONE (OUTPATIENT)
Dept: PHYSICAL THERAPY | Facility: HOSPITAL | Age: 64
End: 2024-06-17

## 2024-06-19 ENCOUNTER — APPOINTMENT (OUTPATIENT)
Dept: PHYSICAL THERAPY | Facility: HOSPITAL | Age: 64
End: 2024-06-19
Payer: MEDICARE

## 2024-06-26 ENCOUNTER — APPOINTMENT (OUTPATIENT)
Dept: PHYSICAL THERAPY | Facility: HOSPITAL | Age: 64
End: 2024-06-26
Payer: MEDICARE

## 2024-06-29 ENCOUNTER — HOSPITAL ENCOUNTER (OUTPATIENT)
Age: 64
Discharge: HOME | End: 2024-06-29
Payer: MEDICARE

## 2024-06-29 VITALS
SYSTOLIC BLOOD PRESSURE: 138 MMHG | HEART RATE: 80 BPM | WEIGHT: 185 LBS | TEMPERATURE: 98 F | RESPIRATION RATE: 18 BRPM | DIASTOLIC BLOOD PRESSURE: 85 MMHG | HEIGHT: 62 IN | BODY MASS INDEX: 34.04 KG/M2 | OXYGEN SATURATION: 97 %

## 2024-06-29 DIAGNOSIS — N30.90 CYSTITIS: ICD-10-CM

## 2024-06-29 DIAGNOSIS — R30.0 DYSURIA: Primary | ICD-10-CM

## 2024-06-29 LAB
BILIRUB UR QL STRIP: NEGATIVE
CLARITY UR: CLEAR
COLOR UR: YELLOW
GLUCOSE UR STRIP-MCNC: NEGATIVE MG/DL
KETONES UR STRIP-MCNC: NEGATIVE MG/DL
NITRITE UR QL STRIP: NEGATIVE
PH UR STRIP: 6 [PH]
PROT UR STRIP-MCNC: NEGATIVE MG/DL
SP GR UR STRIP: 1.01
UROBILINOGEN UR STRIP-ACNC: <2 MG/DL

## 2024-06-29 PROCEDURE — 99214 OFFICE O/P EST MOD 30 MIN: CPT | Performed by: PHYSICIAN ASSISTANT

## 2024-06-29 PROCEDURE — 81002 URINALYSIS NONAUTO W/O SCOPE: CPT | Performed by: PHYSICIAN ASSISTANT

## 2024-06-29 RX ORDER — FLUCONAZOLE 150 MG/1
150 TABLET ORAL ONCE
Qty: 1 TABLET | Refills: 1 | Status: SHIPPED | OUTPATIENT
Start: 2024-06-29 | End: 2024-06-29

## 2024-06-29 RX ORDER — PHENAZOPYRIDINE HYDROCHLORIDE 200 MG/1
200 TABLET, FILM COATED ORAL 3 TIMES DAILY PRN
Qty: 6 TABLET | Refills: 0 | Status: SHIPPED | OUTPATIENT
Start: 2024-06-29 | End: 2024-07-06

## 2024-06-29 RX ORDER — CEPHALEXIN 500 MG/1
500 CAPSULE ORAL 2 TIMES DAILY
Qty: 14 CAPSULE | Refills: 0 | Status: SHIPPED | OUTPATIENT
Start: 2024-06-29 | End: 2024-07-06

## 2024-06-29 NOTE — DISCHARGE INSTRUCTIONS
Take antibiotics as instructed.  Go to ER for new/worsening symptoms (i.e. abdominal pain, back pain, fevers, vomiting, etc.)    While taking antibiotics it is recommended that you also take an over the counter probiotics.  If you'd like, you can have 2 servings of yogurt/Kefir daily instead.  Probiotics increase the good bacteria in your gut while the antibiotic fights the bad bacteria that is causing your infection.  The good bacteria in your gut act as part of your immune system.  Taking a probiotic while on antibiotics will decrease the chances of upset stomach and diarrhea, which are common side effects of antibiotics.

## 2024-06-29 NOTE — ED PROVIDER NOTES
Patient Seen in: [facility]      History     Chief Complaint   Patient presents with    Urinary Symptoms     Stated Complaint: UTI    Subjective:   HPI    Patient complains of dysuria, increased urinary frequency and urgency since yesterday.  States that in the past she was getting frequent UTIs and has been working close with urogynecology over the last year as she has been symptomatic but subsequently has negative urine cultures.  She has completed pelvic floor therapy and had been doing well until the symptoms began yesterday.  Denies abdominal/flank pain, fevers, vomiting, diarrhea.  Denies any other complaints or concerns at this time.    Objective:   No pertinent past medical history.            No pertinent past surgical history.              No pertinent social history.            Review of Systems    Positive for stated Chief Complaint: Urinary Symptoms    Other systems are as noted in HPI.  Constitutional and vital signs reviewed.      All other systems reviewed and negative except as noted above.    Physical Exam     ED Triage Vitals [06/29/24 1008]   /85   Pulse 80   Resp 18   Temp 97.9 °F (36.6 °C)   Temp src Temporal   SpO2 97 %   O2 Device None (Room air)       Current Vitals:   Vital Signs  BP: 138/85  Pulse: 80  Resp: 18  Temp: 97.9 °F (36.6 °C)  Temp src: Temporal    Oxygen Therapy  SpO2: 97 %  O2 Device: None (Room air)            Physical Exam  Vitals and nursing note reviewed.   Constitutional:       Appearance: Normal appearance.   Eyes:      Conjunctiva/sclera: Conjunctivae normal.   Cardiovascular:      Rate and Rhythm: Normal rate and regular rhythm.      Heart sounds: Normal heart sounds.   Pulmonary:      Effort: Pulmonary effort is normal.      Breath sounds: Normal breath sounds.   Abdominal:      Tenderness: There is no right CVA tenderness or left CVA tenderness.   Skin:     General: Skin is warm and dry.   Neurological:      General: No focal deficit  present.      Mental Status: She is alert.   Psychiatric:         Mood and Affect: Mood normal.               ED Course     Labs Reviewed   [facility] POCT URINALYSIS DIPSTICK - Abnormal; Notable for the following components:       Result Value    Blood, Urine Moderate (*)     Leukocyte esterase urine Moderate (*)     All other components within normal limits   URINE CULTURE, ROUTINE                      MDM      Differential diagnosis includes but is not limited to UTI, pyelonephritis, kidney stone, interstitial cystitis.    Patient well-appearing, nontoxic.  Discussed UA findings here and differential diagnosis.  Plan to treat with antibiotics, culture pending.  I advised that she can discuss with her uro-GYN if they would prefer her to await antibiotics until culture results.  I advised supportive care at home, follow-up and provided return precautions.  Patient verbalized understanding agreement plan.    This report has been produced using speech recognition software and may contain errors related to that system including, but not limited to, errors in grammar, punctuation, and spelling, as well as words and phrases that possibly may have been recognized inappropriately.  If there are any questions or concerns, contact the dictating provider for clarification.     NOTE: The 21st Century Cares Act makes medical notes available to patients.  Be advised that this is a medical document written in medical language and may contain abbreviations or verbiage that is unfamiliar or direct.  It is primarily intended to carry relevant historical information, physical exam findings, and the clinical assessment of the physician.                                     Medical Decision Making  Risk  Prescription drug management.        Disposition and Plan     Clinical Impression:  1. Dysuria    2. Cystitis         Disposition:  Discharge  6/29/2024 10:37 am    Follow-up:  Tani Calderon MD  [address] [place] [address]  [id number]  [phone]                Medications Prescribed:  Discharge Medication List as of 6/29/2024 10:40 AM        START taking these medications    Details   cephalexin 500 MG Oral Cap Take 1 capsule (500 mg total) by mouth 2 (two) times daily for 7 days., Normal, Disp-14 capsule, R-0      phenazopyridine 200 MG Oral Tab Take 1 tablet (200 mg total) by mouth 3 (three) times daily as needed for Pain., Normal, Disp-6 tablet, R-0      fluconazole (DIFLUCAN) 150 MG Oral Tab Take 1 tablet (150 mg total) by mouth once for 1 dose., Normal, Disp-1 tablet, R-1

## 2024-07-01 ENCOUNTER — TELEPHONE (OUTPATIENT)
Dept: UROLOGY | Facility: HOSPITAL | Age: 64
End: 2024-07-01

## 2024-07-01 DIAGNOSIS — R30.0 DYSURIA: Primary | ICD-10-CM

## 2024-07-01 RX ORDER — PHENAZOPYRIDINE HYDROCHLORIDE 200 MG/1
200 TABLET, FILM COATED ORAL 3 TIMES DAILY PRN
Qty: 30 TABLET | Refills: 1 | Status: SHIPPED | OUTPATIENT
Start: 2024-07-01

## 2024-07-01 NOTE — TELEPHONE ENCOUNTER
TC to patient  Had UTI sx over the weekend  Went to UC, ucx ordered  Ucx showed 50-99l Staph, prescribed Keflex 500mg BID for 7 days  Patient informed she is on correct antibiotic and to complete full course of antibiotics as prescribed  Patient requested refill of pyridium, sent to pharmacy  Patient verbalized understanding and had no further questions

## 2024-07-03 ENCOUNTER — APPOINTMENT (OUTPATIENT)
Dept: PHYSICAL THERAPY | Facility: HOSPITAL | Age: 64
End: 2024-07-03
Payer: MEDICARE

## 2024-07-10 ENCOUNTER — APPOINTMENT (OUTPATIENT)
Dept: PHYSICAL THERAPY | Facility: HOSPITAL | Age: 64
End: 2024-07-10
Payer: MEDICARE

## 2024-07-22 ENCOUNTER — TELEPHONE (OUTPATIENT)
Dept: UROLOGY | Facility: CLINIC | Age: 64
End: 2024-07-22

## 2024-07-22 ENCOUNTER — LAB ENCOUNTER (OUTPATIENT)
Dept: LAB | Age: 64
End: 2024-07-22
Attending: PHYSICIAN ASSISTANT
Payer: MEDICARE

## 2024-07-22 DIAGNOSIS — R30.0 DYSURIA: Primary | ICD-10-CM

## 2024-07-22 DIAGNOSIS — R30.0 DYSURIA: ICD-10-CM

## 2024-07-22 PROCEDURE — 87086 URINE CULTURE/COLONY COUNT: CPT

## 2024-07-22 RX ORDER — CEPHALEXIN 500 MG/1
500 CAPSULE ORAL 2 TIMES DAILY
Qty: 14 CAPSULE | Refills: 0 | Status: SHIPPED | OUTPATIENT
Start: 2024-07-22

## 2024-07-22 NOTE — TELEPHONE ENCOUNTER
Pt arrived to clinic wanting to drop off urine specimen.     Patient complains of UTI signs and symptoms including urgency, frequency, dysuria x 1 day    Denies fever    Allergies and previous cultures reviewed    Hx incomplete emptying:  N    Using Estrace: Y or N or N/A    Recent ucxs:   6/29/24-50-99K staph-Tx-Keflex 500mg BID for 7 days   6/11/24-no growth  1/16/24-no growth  10/2/23-no growth    Discussed with STELLA Degroot, MICHELLE   Keflex 500mg BID for 7 days     Explained to pt we would need to schedule a RN visit to drop urine off here at clinic.     Urine culture ordered, will test @ [facility] lab    Empiric antibiotics sent to patient's preferred pharmacy     Encouraged PO hydration    All questions answered    Encouraged to call if symptoms worsen or fail to improve     Patient understands and agrees to plan

## 2024-07-30 ENCOUNTER — TELEPHONE (OUTPATIENT)
Dept: INTERNAL MEDICINE CLINIC | Facility: CLINIC | Age: 64
End: 2024-07-30

## 2024-07-30 NOTE — TELEPHONE ENCOUNTER
Spoke with pt, stated she abdominal pain that gets worse as day progresses , it is below belly button and starts pulsating  Pt also has history of colon resection and diverticulitis  Advised to go to ER to get CT abdomen done to further evaluate her pain and get treatment  Pt will go to urgent care in [facility] that has CT on site

## 2024-07-30 NOTE — TELEPHONE ENCOUNTER
Patient states that she is having abdominal pain for few days - patient would like to be seen today.

## 2024-09-17 DIAGNOSIS — E78.2 MIXED HYPERLIPIDEMIA: ICD-10-CM

## 2024-09-17 RX ORDER — ATORVASTATIN CALCIUM 20 MG/1
20 TABLET, FILM COATED ORAL NIGHTLY
Qty: 90 TABLET | Refills: 0 | Status: SHIPPED | OUTPATIENT
Start: 2024-09-17 | End: 2024-09-19

## 2024-09-17 NOTE — TELEPHONE ENCOUNTER
Last time medication was refilled 9/25/23  Last office visit  3/1/24  Next office visit due/scheduled   Future Appointments   Date Time Provider Department Center   9/19/2024  8:00 AM Selina Leonard APRN [facility] [address]   6/24/2025  9:30 AM Josette Tavares DO [facility] None     Passed protocol, Medication sent.

## 2024-09-19 ENCOUNTER — OFFICE VISIT (OUTPATIENT)
Dept: INTERNAL MEDICINE CLINIC | Facility: CLINIC | Age: 64
End: 2024-09-19
Payer: COMMERCIAL

## 2024-09-19 ENCOUNTER — LAB ENCOUNTER (OUTPATIENT)
Dept: LAB | Age: 64
End: 2024-09-19
Attending: NURSE PRACTITIONER
Payer: MEDICARE

## 2024-09-19 VITALS
WEIGHT: 185 LBS | HEART RATE: 82 BPM | DIASTOLIC BLOOD PRESSURE: 78 MMHG | HEIGHT: 62 IN | BODY MASS INDEX: 34.04 KG/M2 | RESPIRATION RATE: 16 BRPM | SYSTOLIC BLOOD PRESSURE: 122 MMHG | TEMPERATURE: 98 F | OXYGEN SATURATION: 99 %

## 2024-09-19 DIAGNOSIS — E78.2 MIXED HYPERLIPIDEMIA: ICD-10-CM

## 2024-09-19 DIAGNOSIS — I10 ESSENTIAL HYPERTENSION, BENIGN: ICD-10-CM

## 2024-09-19 DIAGNOSIS — I44.0 HEART BLOCK AV FIRST DEGREE: ICD-10-CM

## 2024-09-19 DIAGNOSIS — N39.0 RECURRENT UTI: ICD-10-CM

## 2024-09-19 DIAGNOSIS — Z00.00 ENCOUNTER FOR ANNUAL HEALTH EXAMINATION: Primary | ICD-10-CM

## 2024-09-19 DIAGNOSIS — Z12.31 ENCOUNTER FOR SCREENING MAMMOGRAM FOR MALIGNANT NEOPLASM OF BREAST: ICD-10-CM

## 2024-09-19 LAB
ALBUMIN SERPL-MCNC: 4.8 G/DL (ref 3.2–4.8)
ALBUMIN/GLOB SERPL: 1.8 {RATIO} (ref 1–2)
ALP LIVER SERPL-CCNC: 76 U/L
ALT SERPL-CCNC: 38 U/L
ANION GAP SERPL CALC-SCNC: 5 MMOL/L (ref 0–18)
AST SERPL-CCNC: 32 U/L (ref ?–34)
BASOPHILS # BLD AUTO: 0.05 X10(3) UL (ref 0–0.2)
BASOPHILS NFR BLD AUTO: 0.7 %
BILIRUB SERPL-MCNC: 0.7 MG/DL (ref 0.2–1.1)
BILIRUB UR QL STRIP.AUTO: NEGATIVE
BUN BLD-MCNC: 14 MG/DL (ref 9–23)
CALCIUM BLD-MCNC: 10.1 MG/DL (ref 8.7–10.4)
CHLORIDE SERPL-SCNC: 105 MMOL/L (ref 98–112)
CHOLEST SERPL-MCNC: 152 MG/DL (ref ?–200)
CLARITY UR REFRACT.AUTO: CLEAR
CO2 SERPL-SCNC: 29 MMOL/L (ref 21–32)
COLOR UR AUTO: COLORLESS
CREAT BLD-MCNC: 1.14 MG/DL
EGFRCR SERPLBLD CKD-EPI 2021: 53 ML/MIN/1.73M2 (ref 60–?)
EOSINOPHIL # BLD AUTO: 0 X10(3) UL (ref 0–0.7)
EOSINOPHIL NFR BLD AUTO: 0 %
ERYTHROCYTE [DISTWIDTH] IN BLOOD BY AUTOMATED COUNT: 12.9 %
FASTING PATIENT LIPID ANSWER: YES
FASTING STATUS PATIENT QL REPORTED: YES
GLOBULIN PLAS-MCNC: 2.7 G/DL (ref 2–3.5)
GLUCOSE BLD-MCNC: 93 MG/DL (ref 70–99)
GLUCOSE UR STRIP.AUTO-MCNC: NORMAL MG/DL
HCT VFR BLD AUTO: 45.4 %
HDLC SERPL-MCNC: 52 MG/DL (ref 40–59)
HGB BLD-MCNC: 14.8 G/DL
IMM GRANULOCYTES # BLD AUTO: 0.04 X10(3) UL (ref 0–1)
IMM GRANULOCYTES NFR BLD: 0.6 %
KETONES UR STRIP.AUTO-MCNC: NEGATIVE MG/DL
LDLC SERPL CALC-MCNC: 81 MG/DL (ref ?–100)
LEUKOCYTE ESTERASE UR QL STRIP.AUTO: NEGATIVE
LYMPHOCYTES # BLD AUTO: 1.83 X10(3) UL (ref 1–4)
LYMPHOCYTES NFR BLD AUTO: 25.2 %
MCH RBC QN AUTO: 30.8 PG (ref 26–34)
MCHC RBC AUTO-ENTMCNC: 32.6 G/DL (ref 31–37)
MCV RBC AUTO: 94.4 FL
MONOCYTES # BLD AUTO: 0.48 X10(3) UL (ref 0.1–1)
MONOCYTES NFR BLD AUTO: 6.6 %
NEUTROPHILS # BLD AUTO: 4.87 X10 (3) UL (ref 1.5–7.7)
NEUTROPHILS # BLD AUTO: 4.87 X10(3) UL (ref 1.5–7.7)
NEUTROPHILS NFR BLD AUTO: 66.9 %
NITRITE UR QL STRIP.AUTO: NEGATIVE
NONHDLC SERPL-MCNC: 100 MG/DL (ref ?–130)
OSMOLALITY SERPL CALC.SUM OF ELEC: 288 MOSM/KG (ref 275–295)
PH UR STRIP.AUTO: 6.5 [PH] (ref 5–8)
PLATELET # BLD AUTO: 347 10(3)UL (ref 150–450)
POTASSIUM SERPL-SCNC: 4.7 MMOL/L (ref 3.5–5.1)
PROT SERPL-MCNC: 7.5 G/DL (ref 5.7–8.2)
PROT UR STRIP.AUTO-MCNC: NEGATIVE MG/DL
RBC # BLD AUTO: 4.81 X10(6)UL
RBC UR QL AUTO: NEGATIVE
SODIUM SERPL-SCNC: 139 MMOL/L (ref 136–145)
SP GR UR STRIP.AUTO: <1.005 (ref 1–1.03)
TRIGL SERPL-MCNC: 102 MG/DL (ref 30–149)
TSI SER-ACNC: 2.04 MIU/ML (ref 0.55–4.78)
UROBILINOGEN UR STRIP.AUTO-MCNC: NORMAL MG/DL
VLDLC SERPL CALC-MCNC: 16 MG/DL (ref 0–30)
WBC # BLD AUTO: 7.3 X10(3) UL (ref 4–11)

## 2024-09-19 PROCEDURE — 3008F BODY MASS INDEX DOCD: CPT | Performed by: NURSE PRACTITIONER

## 2024-09-19 PROCEDURE — 81003 URINALYSIS AUTO W/O SCOPE: CPT

## 2024-09-19 PROCEDURE — 1160F RVW MEDS BY RX/DR IN RCRD: CPT | Performed by: NURSE PRACTITIONER

## 2024-09-19 PROCEDURE — 85025 COMPLETE CBC W/AUTO DIFF WBC: CPT

## 2024-09-19 PROCEDURE — 36415 COLL VENOUS BLD VENIPUNCTURE: CPT

## 2024-09-19 PROCEDURE — 1126F AMNT PAIN NOTED NONE PRSNT: CPT | Performed by: NURSE PRACTITIONER

## 2024-09-19 PROCEDURE — 1159F MED LIST DOCD IN RCRD: CPT | Performed by: NURSE PRACTITIONER

## 2024-09-19 PROCEDURE — 3074F SYST BP LT 130 MM HG: CPT | Performed by: NURSE PRACTITIONER

## 2024-09-19 PROCEDURE — 3078F DIAST BP <80 MM HG: CPT | Performed by: NURSE PRACTITIONER

## 2024-09-19 PROCEDURE — 96160 PT-FOCUSED HLTH RISK ASSMT: CPT | Performed by: NURSE PRACTITIONER

## 2024-09-19 PROCEDURE — G0439 PPPS, SUBSEQ VISIT: HCPCS | Performed by: NURSE PRACTITIONER

## 2024-09-19 PROCEDURE — 1170F FXNL STATUS ASSESSED: CPT | Performed by: NURSE PRACTITIONER

## 2024-09-19 PROCEDURE — 80061 LIPID PANEL: CPT

## 2024-09-19 PROCEDURE — 80053 COMPREHEN METABOLIC PANEL: CPT

## 2024-09-19 PROCEDURE — 84443 ASSAY THYROID STIM HORMONE: CPT

## 2024-09-19 RX ORDER — ATORVASTATIN CALCIUM 20 MG/1
20 TABLET, FILM COATED ORAL NIGHTLY
Qty: 90 TABLET | Refills: 0 | Status: CANCELLED | OUTPATIENT
Start: 2024-09-19

## 2024-09-19 RX ORDER — LISINOPRIL 10 MG/1
10 TABLET ORAL DAILY
Qty: 90 TABLET | Refills: 3 | Status: SHIPPED | OUTPATIENT
Start: 2024-09-19

## 2024-09-19 RX ORDER — ATORVASTATIN CALCIUM 20 MG/1
20 TABLET, FILM COATED ORAL NIGHTLY
Qty: 90 TABLET | Refills: 3 | Status: SHIPPED | OUTPATIENT
Start: 2024-09-19

## 2024-09-19 NOTE — PROGRESS NOTES
Subjective:   Susie Schulz is a 64 year old female who presents for a MA AHA (Medicare Advantage Annual Health Assessment) and scheduled follow up of multiple significant but stable problems.   Had COVID last week. Recovered well. Taking medication as prescribed, no side effects.     History/Other:   Fall Risk Assessment:   She has been screened for Falls and is High Risk. Fall Prevention information provided to patient in After Visit Summary.    Do you feel unsteady when standing or walking?: No  Do you worry about falling?: Yes  Have you fallen in the past year?: Yes  How many times have you fallen?: 1  Were you injured?: Yes     Cognitive Assessment:   She had a completely normal cognitive assessment - see flowsheet entries     Functional Ability/Status:   Susie Schulz has a completely normal functional assessment. See flowsheet for details.        Depression Screening (PHQ):  PHQ-2 SCORE: 0  , done 9/19/2024        <5 minutes spent screening and counseling for depression    Advanced Directives:   She does NOT have a Living Will. [Do you have a living will?: No]  She does NOT have a Power of  for Health Care. [Do you have a healthcare power of ?: No]  Discussed Advance Care Planning with patient (and family/surrogate if present). Standard forms made available to patient in After Visit Summary.      Patient Active Problem List   Diagnosis    Essential hypertension, benign    Mixed hyperlipidemia    Benign essential tremor    Hepatitis C antibody test negative    Heart block AV first degree    Recurrent UTI     Allergies:  She has No Known Allergies.    Current Medications:  Outpatient Medications Marked as Taking for the 9/19/24 encounter (Office Visit) with Selina Leonard APRN   Medication Sig    lisinopril 10 MG Oral Tab Take 1 tablet (10 mg total) by mouth daily.    atorvastatin 20 MG Oral Tab Take 1 tablet (20 mg total) by mouth nightly.    phenazopyridine 200 MG Oral Tab Take 1  tablet (200 mg total) by mouth 3 (three) times daily as needed for Pain.    chlorhexidine gluconate 0.12 % Mouth/Throat Solution FLOSS AND BRUSH TEETH THEN SWISH ACCORDING TO PACKAGING INSTRUCTION    estradiol (ESTRACE) 0.1 MG/GM Vaginal Cream Apply 1/2 gram vaginally 2 times per week.    Cholecalciferol (VITAMIN D-3) 25 MCG (1000 UT) Oral Cap     Melatonin 10 MG Oral Tab nightly.    Multiple Minerals-Vitamins (CALCIUM CITRATE PLUS/MAGNESIUM) Oral Tab     ketoconazole 2 % External Shampoo     Multiple Vitamins-Minerals (HAIR SKIN AND NAILS FORMULA OR) Take 1 tablet by mouth daily.    Levocetirizine Dihydrochloride 5 MG Oral Tab Take 1 tablet (5 mg total) by mouth every evening. Alternates with Allegra    Multiple Vitamin (MULTI-VITAMIN DAILY) Oral Tab Take by mouth daily.       Medical History:  She  has a past medical history of Abdominal pain (Unknown), Arrhythmia, Back pain (´94), Back problem, Bad breath (Unknown), Constipation (Unknown), Diverticulitis (2015), Diverticulosis, Essential hypertension, Flatulence/gas pain/belching (Unknown), Fracture, Headache disorder (Unknown), High blood pressure, High cholesterol (Unknown, ~ 5 or 6 yrs), adenomatous colonic polyps (10/21/2021), Hyperlipidemia, Irritable bowel syndrome, Osteoarthritis, Uncomfortable fullness after meals (Unknown), Visual impairment, and Wears glasses (Unknown).  Surgical History:  She  has a past surgical history that includes back surgery; colonoscopy; sinus surgery  ; other surgical history; colonoscopy (2018); egd (10/19/2016); spine surgery procedure unlisted (´94, ´04, ´97); tonsillectomy (´81); Colectomy (2022); ; and endometrial ablation.   Family History:  Her family history includes Breast Cancer in her sister; Breast Cancer (age of onset: 40) in her sister; Breast Cancer (age of onset: 80) in her maternal grandmother; Breast Cancer (age of onset: 81) in her mother; Colon Polyps in her father; Diabetes in her  mother; Hypertension in her father, mother, sister, and sister; Stroke in her mother.  Social History:  She  reports that she has never smoked. She has never used smokeless tobacco. She reports current alcohol use. She reports that she does not use drugs.    Tobacco:  She has never smoked tobacco.    CAGE Alcohol Screen:   CAGE screening score of 0 on 9/19/2024, showing low risk of alcohol abuse.      Patient Care Team:  Tani Calderon MD as PCP - General (Internal Medicine)  Selina Leonard APRN (Nurse Practitioner)  Faina Forrest PT (Physical Therapy)  Danielle, Generic Provider  Faina Forrest PT as Physical Therapist (Physical Therapy)    Review of Systems  GENERAL: feels well otherwise  SKIN: denies any unusual skin lesions  EYES: denies blurred vision or double vision  HEENT: denies nasal congestion, sinus pain or ST  LUNGS: denies shortness of breath with exertion  CARDIOVASCULAR: denies chest pain on exertion  GI: denies abdominal pain, denies heartburn  : denies dysuria, vaginal discharge or itching, complaint of urinary incontinence   MUSCULOSKELETAL: denies back pain  NEURO: denies headaches  PSYCHE: denies depression or anxiety  HEMATOLOGIC: denies hx of anemia  ENDOCRINE: denies thyroid history  ALL/ASTHMA: denies hx of allergy or asthma    Objective:   Physical Exam  General Appearance:  Alert, cooperative, no distress, appears stated age   Head:  Normocephalic, without obvious abnormality, atraumatic   Eyes:  PERRL, conjunctiva/corneas clear, EOM's intact both eyes   Ears:  Normal TM's and external ear canals, both ears   Throat: Lips, mucosa, and tongue normal; teeth and gums normal   Neck: Supple, symmetrical, trachea midline, no adenopathy;  thyroid: not enlarged, symmetric, no tenderness/mass/nodules; no carotid bruit or JVD   Back:   Symmetric, no curvature, ROM normal, no CVA tenderness   Lungs:   Clear to auscultation bilaterally, respirations unlabored   Heart:  Regular rate and  rhythm, S1 and S2 normal, no murmur, rub, or gallop   Abdomen:   Soft, non-tender, bowel sounds active all four quadrants,  no masses, no organomegaly   Pelvic: Deferred   Extremities: Extremities normal, atraumatic, no cyanosis or edema   Pulses: 2+ and symmetric   Skin: Skin color, texture, turgor normal, no rashes or lesions   Lymph nodes: Cervical, supraclavicular, and axillary nodes normal   Neurologic: Normal       /78   Pulse 82   Temp 97.8 °F (36.6 °C)   Resp 16   Ht 5' 2\" (1.575 m)   Wt 185 lb (83.9 kg)   SpO2 99%   BMI 33.84 kg/m²  Estimated body mass index is 33.84 kg/m² as calculated from the following:    Height as of this encounter: 5' 2\" (1.575 m).    Weight as of this encounter: 185 lb (83.9 kg).    Medicare Hearing Assessment:   Hearing Screening    Time taken: 9/19/2024  8:43 AM  Screening Method: Whisper Test  Whisper Test Result: Pass         Visual Acuity:   Right Eye Visual Acuity: Corrected Right Eye Chart Acuity: 20/20   Left Eye Visual Acuity: Corrected Left Eye Chart Acuity: 20/20   Both Eyes Visual Acuity: Corrected Both Eyes Chart Acuity: 20/20   Able To Tolerate Visual Acuity: Yes        Assessment & Plan:   Susie Schulz is a 64 year old female who presents for a Medicare Assessment.     1. Encounter for annual health examination (Primary)  2. Mixed hyperlipidemia- tolerating statin, check labs today, CPM  -     Atorvastatin Calcium; Take 1 tablet (20 mg total) by mouth nightly.  Dispense: 90 tablet; Refill: 3  -     Lipid Panel; Future; Expected date: 09/19/2024  3. Essential hypertension, benign- well controlled, CPM  -     Lisinopril; Take 1 tablet (10 mg total) by mouth daily.  Dispense: 90 tablet; Refill: 3  -     CBC With Differential With Platelet; Future; Expected date: 09/19/2024  -     Comp Metabolic Panel (14); Future; Expected date: 09/19/2024  -     TSH W Reflex To Free T4; Future; Expected date: 09/19/2024  4. Encounter for screening mammogram for malignant  neoplasm of breast  -     IRMA MARIAMA 2D+3D SCREENING BILAT (CPT=77067/30922); Future; Expected date: 11/11/2024  5. Recurrent UTI- standing order placed   -     Urinalysis with Culture Reflex; Standing  6. Heart block AV first degree- asymptomatic continue to monitor     The patient indicates understanding of these issues and agrees to the plan.  Lab work ordered.  Reinforced healthy diet, lifestyle, and exercise.      Return in about 1 year (around 9/19/2025) for Annual physical.     Selina Leonard, KVNG, 9/19/2024     Supplementary Documentation:   General Health:  In the past six months, have you lost more than 10 pounds without trying?: 2 - No  Has your appetite been poor?: No  Type of Diet: Balanced  How does the patient maintain a good energy level?: Daily Walks  How would you describe your daily physical activity?: Light  How would you describe your current health state?: Good  How do you maintain positive mental well-being?: Social Interaction;Puzzles;Games;Visiting Friends;Visiting Family  On a scale of 0 to 10, with 0 being no pain and 10 being severe pain, what is your pain level?: 0 - (None)  In the past six months, have you experienced urine leakage?: 1-Yes  At any time do you feel concerned for the safety/well-being of yourself and/or your children, in your home or elsewhere?: No  Have you had any immunizations at another office such as Influenza, Hepatitis B, Tetanus, or Pneumococcal?: No

## 2024-11-23 ENCOUNTER — HOSPITAL ENCOUNTER (OUTPATIENT)
Dept: MAMMOGRAPHY | Facility: HOSPITAL | Age: 64
Discharge: HOME | End: 2024-11-23
Attending: NURSE PRACTITIONER
Payer: MEDICARE

## 2024-11-23 DIAGNOSIS — Z12.31 ENCOUNTER FOR SCREENING MAMMOGRAM FOR MALIGNANT NEOPLASM OF BREAST: ICD-10-CM

## 2024-11-23 PROCEDURE — 77063 BREAST TOMOSYNTHESIS BI: CPT | Performed by: NURSE PRACTITIONER

## 2024-11-23 PROCEDURE — 77067 SCR MAMMO BI INCL CAD: CPT | Performed by: NURSE PRACTITIONER

## 2025-03-09 ENCOUNTER — HOSPITAL ENCOUNTER (OUTPATIENT)
Age: 65
Discharge: HOME | End: 2025-03-09
Payer: MEDICARE

## 2025-03-09 ENCOUNTER — APPOINTMENT (OUTPATIENT)
Dept: GENERAL RADIOLOGY | Age: 65
End: 2025-03-09
Attending: NURSE PRACTITIONER
Payer: MEDICARE

## 2025-03-09 VITALS
DIASTOLIC BLOOD PRESSURE: 92 MMHG | SYSTOLIC BLOOD PRESSURE: 150 MMHG | OXYGEN SATURATION: 96 % | HEART RATE: 90 BPM | TEMPERATURE: 99 F | RESPIRATION RATE: 19 BRPM

## 2025-03-09 DIAGNOSIS — R06.00 DYSPNEA, UNSPECIFIED TYPE: ICD-10-CM

## 2025-03-09 DIAGNOSIS — R05.9 COUGH: ICD-10-CM

## 2025-03-09 DIAGNOSIS — B34.9 VIRAL ILLNESS: Primary | ICD-10-CM

## 2025-03-09 LAB
#MXD IC: 0.5 X10ˆ3/UL (ref 0.1–1)
ATRIAL RATE: 89 BPM
BUN BLD-MCNC: 17 MG/DL (ref 7–18)
CHLORIDE BLD-SCNC: 104 MMOL/L (ref 98–112)
CO2 BLD-SCNC: 25 MMOL/L (ref 21–32)
CREAT BLD-MCNC: 1.1 MG/DL
DDIMER WHOLE BLOOD: <200 NG/ML DDU (ref ?–400)
EGFRCR SERPLBLD CKD-EPI 2021: 55 ML/MIN/1.73M2 (ref 60–?)
GLUCOSE BLD-MCNC: 102 MG/DL (ref 70–99)
HCT VFR BLD AUTO: 41.2 %
HCT VFR BLD CALC: 44 %
HGB BLD-MCNC: 14 G/DL
ISTAT IONIZED CALCIUM FOR CHEM 8: 1.16 MMOL/L (ref 1.12–1.32)
LYMPHOCYTES # BLD AUTO: 1 X10ˆ3/UL (ref 1–4)
LYMPHOCYTES NFR BLD AUTO: 10.3 %
MCH RBC QN AUTO: 31 PG (ref 26–34)
MCHC RBC AUTO-ENTMCNC: 34 G/DL (ref 31–37)
MCV RBC AUTO: 91.2 FL (ref 80–100)
MIXED CELL %: 4.8 %
NEUTROPHILS # BLD AUTO: 7.9 X10ˆ3/UL (ref 1.5–7.7)
NEUTROPHILS NFR BLD AUTO: 84.9 %
P AXIS: 36 DEGREES
P-R INTERVAL: 204 MS
PLATELET # BLD AUTO: 273 X10ˆ3/UL (ref 150–450)
POCT INFLUENZA A: NEGATIVE
POCT INFLUENZA B: NEGATIVE
POTASSIUM BLD-SCNC: 4.4 MMOL/L (ref 3.6–5.1)
Q-T INTERVAL: 360 MS
QRS DURATION: 92 MS
QTC CALCULATION (BEZET): 438 MS
R AXIS: 51 DEGREES
RBC # BLD AUTO: 4.52 X10ˆ6/UL
SARS-COV-2 RNA RESP QL NAA+PROBE: NOT DETECTED
SODIUM BLD-SCNC: 141 MMOL/L (ref 136–145)
T AXIS: 29 DEGREES
TROPONIN I BLD-MCNC: <0.02 NG/ML
VENTRICULAR RATE: 89 BPM
WBC # BLD AUTO: 9.4 X10ˆ3/UL (ref 4–11)

## 2025-03-09 PROCEDURE — 85025 COMPLETE CBC W/AUTO DIFF WBC: CPT | Performed by: NURSE PRACTITIONER

## 2025-03-09 PROCEDURE — 71046 X-RAY EXAM CHEST 2 VIEWS: CPT | Performed by: NURSE PRACTITIONER

## 2025-03-09 PROCEDURE — 84484 ASSAY OF TROPONIN QUANT: CPT | Performed by: NURSE PRACTITIONER

## 2025-03-09 PROCEDURE — 87502 INFLUENZA DNA AMP PROBE: CPT | Performed by: NURSE PRACTITIONER

## 2025-03-09 PROCEDURE — 99214 OFFICE O/P EST MOD 30 MIN: CPT | Performed by: NURSE PRACTITIONER

## 2025-03-09 PROCEDURE — 85378 FIBRIN DEGRADE SEMIQUANT: CPT | Performed by: NURSE PRACTITIONER

## 2025-03-09 PROCEDURE — U0002 COVID-19 LAB TEST NON-CDC: HCPCS | Performed by: NURSE PRACTITIONER

## 2025-03-09 PROCEDURE — 93000 ELECTROCARDIOGRAM COMPLETE: CPT | Performed by: NURSE PRACTITIONER

## 2025-03-09 PROCEDURE — 80047 BASIC METABLC PNL IONIZED CA: CPT | Performed by: NURSE PRACTITIONER

## 2025-03-09 RX ORDER — GUAIFENESIN 600 MG/1
1200 TABLET, EXTENDED RELEASE ORAL 2 TIMES DAILY
Qty: 20 TABLET | Refills: 0 | Status: SHIPPED | OUTPATIENT
Start: 2025-03-09 | End: 2025-03-14

## 2025-03-09 RX ORDER — BENZONATATE 100 MG/1
100 CAPSULE ORAL 3 TIMES DAILY PRN
Qty: 30 CAPSULE | Refills: 0 | Status: SHIPPED | OUTPATIENT
Start: 2025-03-09 | End: 2025-04-08

## 2025-03-09 NOTE — DISCHARGE INSTRUCTIONS
- Tessalon 1 capusule every 8 hours as needed.  - Take plain mucinex - 1200mg twice a day with a big glass of water  - Nasal saline - either spray (\"Ocean\" brand) or [facility] Sinus Rinse 3 times a day  Flonase: 2 sprays to each nostril in the AM.  - Rest and push fluids  - Hot lemon tea with honey  - Steam twice a day (either in shower or with cup of hot water and a towel over your head)     Please follow up with your primary care doctor office tomorrow.     If any persistent, worsening or new/ concerning symptoms develop please go to the Emergency room.

## 2025-03-09 NOTE — ED PROVIDER NOTES
Patient Seen in: [facility]      History     Chief Complaint   Patient presents with    Cough/URI     Stated Complaint: cough    Subjective:   65 yo female with PMH of HTN, essential tremor, diverticulitis presents with complaint of cough, low grade fever (tmax 100), chills, and occasional shortness of breath and elevated heart rate that began 24 hours ago. Palpitations not associated with shortness of breath.   States her watch was notifying her of elevated HR between 108-119. No hx of arrhythmias.  States she has chest pain with coughing.   Last travel was in February to [place].   Pt's sister recently passed; attended her  last week.   No OTC's taken for symptoms.      Pt denies dyspnea with exertion, radiating chest pain, peripheral edema, nausea, vomiting, diarrhea, abdominal pain, back pain. No use of hormones. No hx of DVT/PE.     The history is provided by the patient.         Objective:     Past Medical History:    Abdominal pain    Occasional    Arrhythmia    First degree AV Block    Back pain    Bx Sx X 3    Back problem    Bad breath    Occasional    Constipation    Since childhood    Diverticulitis    Diverticulosis    Essential hypertension    Flatulence/gas pain/belching    Since childhood    Fracture    Rt shoulder    Headache disorder    Migraines since childhood, very rare since menopause    High blood pressure    High cholesterol    Controlled with statin    Hx of adenomatous colonic polyps    Hyperlipidemia    Irritable bowel syndrome    Osteoarthritis    Uncomfortable fullness after meals    Occasional    Visual impairment    Wears glasses    Glasses              Past Surgical History:   Procedure Laterality Date    Back surgery      x2    Colectomy  2022    Colonoscopy      Colonoscopy  2018    Egd  10/19/2016    At [place] (also had one at this location, date unknown)    Endometrial ablation            Other surgical history      uterine ablation     Sinus surgery        Spine surgery procedure unlisted  ´94, ´04, ´97    Tonsillectomy  ´81    Tonsillectomy & Carter-Srinivas                Social History     Socioeconomic History    Marital status:    Tobacco Use    Smoking status: Never    Smokeless tobacco: Never   Vaping Use    Vaping status: Never Used   Substance and Sexual Activity    Alcohol use: Yes     Alcohol/week: 0.0 standard drinks of alcohol     Comment: Rare    Drug use: Never     Social Drivers of Health     Food Insecurity: No Food Insecurity (9/16/2020)    [facility]    Hunger Vital Sign     Worried About Running Out of Food in the Last Year: Never true     Ran Out of Food in the Last Year: Never true   Transportation Needs: No Transportation Needs (9/16/2020)    [facility]    PRAPARE - Transportation     Lack of Transportation (Medical): No     Lack of Transportation (Non-Medical): No              Review of Systems   Constitutional:  Positive for chills and fever.   HENT:  Positive for rhinorrhea. Negative for trouble swallowing.    Respiratory:  Positive for cough and shortness of breath.    Cardiovascular:  Positive for chest pain and palpitations. Negative for leg swelling.   Gastrointestinal:  Negative for diarrhea, nausea and vomiting.   Neurological:  Negative for dizziness and weakness.       Positive for stated complaint: cough  Other systems are as noted in HPI.  Constitutional and vital signs reviewed.      All other systems reviewed and negative except as noted above.    Physical Exam     ED Triage Vitals [03/09/25 1404]   BP (!) 150/92   Pulse 90   Resp 19   Temp 99.3 °F (37.4 °C)   Temp src Oral   SpO2 96 %   O2 Device None (Room air)       Current Vitals:   Vital Signs  BP: (!) 150/92  Pulse: 90  Resp: 19  Temp: 99.3 °F (37.4 °C)  Temp src: Oral    Oxygen Therapy  SpO2: 96 %  O2 Device:  None (Room air)        Physical Exam  Vitals and nursing note reviewed.   Constitutional:       General: She is not in acute distress.     Appearance: Normal appearance. She is not ill-appearing, toxic-appearing or diaphoretic.   HENT:      Head: Normocephalic.      Right Ear: Tympanic membrane normal.      Left Ear: Tympanic membrane normal.      Nose: Congestion and rhinorrhea present.      Mouth/Throat:      Mouth: Mucous membranes are moist.      Pharynx: Oropharynx is clear.   Eyes:      General: No scleral icterus.     Conjunctiva/sclera: Conjunctivae normal.   Cardiovascular:      Rate and Rhythm: Normal rate and regular rhythm.      Heart sounds: No murmur heard.     No friction rub.   Pulmonary:      Effort: Pulmonary effort is normal. No respiratory distress.      Breath sounds: No stridor. No wheezing, rhonchi or rales.   Chest:      Chest wall: No tenderness.   Abdominal:      General: Abdomen is flat.      Palpations: Abdomen is soft.      Tenderness: There is no abdominal tenderness.   Musculoskeletal:      Cervical back: Normal range of motion and neck supple.      Right lower leg: No edema.      Left lower leg: No edema.   Lymphadenopathy:      Cervical: No cervical adenopathy.   Skin:     General: Skin is warm and dry.      Findings: No rash.   Neurological:      General: No focal deficit present.      Mental Status: She is alert.   Psychiatric:         Mood and Affect: Mood normal.             ED Course     Labs Reviewed   POCT CBC - Abnormal; Notable for the following components:       Result Value    # Neutrophil 7.9 (*)     All other components within normal limits   POCT ISTAT CHEM8 CARTRIDGE - Abnormal; Notable for the following components:    ISTAT Glucose 102 (*)     ISTAT Creatinine 1.10 (*)     eGFR-Cr 55 (*)     All other components within normal limits   D-DIMER (POC) - Normal   ISTAT TROPONIN - Normal   POCT FLU TEST - Normal    Narrative:     This assay is a rapid molecular in vitro  test utilizing nucleic acid amplification of influenza A and B viral RNA.   RAPID SARS-COV-2 BY PCR - Normal     POCT Flu Test        Specimen Information: Nares; Other      Component Value Flag Ref Range Units Status    POCT INFLUENZA A Negative      Negative  Final    POCT INFLUENZA B Negative      Negative  Final                  Rapid SARS-CoV-2 by PCR        Specimen Information: Nares; Other      Component Value Flag Ref Range Units Status    Rapid SARS-CoV-2 by PCR Not Detected      Not Detected  Final    Comment:    This test is intended for the qualitative detection of nucleic acid from the SARS-CoV-2 viral RNA from individuals who are suspected of COVID-19 infection by their healthcare provider.    A \"Detected\" result is considered a positive test result for COVID-19.   A \"Not Detected\" result for this test means that SARS-CoV-2 RNA was not present in the sample above the limit of detection of the assay.    Test performed using the Abbott ID NOW COVID-19 assay performed on the ID NOW Instrument, [employer] [place], [place] [address].    This test is being used under the Food and Drug Administration's Emergency Use Authorization.    The authorized Fact Sheet for Healthcare Providers for this assay is available upon request from the laboratory.    [facility]   [address] [place] [address]   Phone: [phone]  Fax: [phone]  [facility] # [id number]                   EKG 12 Lead        Component Value Flag Ref Range Units Status    Ventricular rate 89       BPM Final    Atrial rate 89       BPM Final    P-R Interval 204       ms Final    QRS Duration 92       ms Final    Q-T Interval 360       ms Final    QTC Calculation (Bezet) 438       ms Final    P Axis 36       degrees Final    R Axis 51       degrees Final    T Axis 29       degrees Final                 Normal sinus rhythm  Low voltage QRS, consider pulmonary  disease, pericardial effusion, or normal variant  Borderline ECG  When compared with ECG of 01-MAR-2024 08:50,  No significant change was found  Confirmed by STEPHANI WARNER ANAND ([id number]) on 3/9/2025 9:05:16 PM         XR CHEST PA + LAT CHEST (CPT=71046)          PROCEDURE:  XR CHEST PA + LAT CHEST (CPT=71046)     INDICATIONS:  chest pain, cough     COMPARISON:  [place], XR, XR CHEST PA + LAT CHEST (PHF=03016), 8/04/2019, 4:46 PM.     TECHNIQUE:  PA and lateral chest radiographs were obtained.     PATIENT STATED HISTORY: (As transcribed by Technologist)  Pt with c/o cough that started yesterday.  Pt states symptoms got worse during the night.  Pt with c/o chest discomfort with coughing.      FINDINGS:  The cardiac shadow is enlarged.  No evidence for acute CHF. The lungs are clear.  The costophrenic angles are sharp.  There is no active disease seen.  Vertebral augmentation present.                   =====  CONCLUSION:  The cardiac shadow is enlarged. However, there is no sign of CHF, pneumonia, effusion, edema, or other acute process.     LOCATION:  [facility]        Dictated by (CST): Guilherme Khoury MD on 3/09/2025 at 3:03 PM       Finalized by (CST): Guilherme Khoury MD on 3/09/2025 at 3:04 PM            POCT CBC        Component Value Flag Ref Range Units Status    WBC IC 9.4      4.0 - 11.0 x10ˆ3/uL Final    RBC IC 4.52      3.80 - 5.30 X10ˆ6/uL Final    HGB IC 14.0      12.0 - 16.0 g/dL Final    HCT IC 41.2      35.0 - 48.0 % Final    MCV IC 91.2      80.0 - 100.0 fL Final    MCH IC 31.0      26.0 - 34.0 pg Final    MCHC IC 34.0      31.0 - 37.0 g/dL Final    PLT .0      150.0 - 450.0 X10ˆ3/uL Final    # Neutrophil 7.9      1.5 - 7.7 X10ˆ3/uL Final    # Lymphocyte 1.0      1.0 - 4.0 X10ˆ3/uL Final    # Mixed Cells 0.5      0.1 - 1.0 X10ˆ3/uL Final    Neutrophil % 84.9       % Final    Lymphocyte % 10.3       % Final    Mixed Cell % 4.8       % Final                  D-Dimer,Triage        Component  Value Flag Ref Range Units Status    D-Dimer DDU <200      <400 ng/mL DDU Final                  POCT ISTAT chem8 cartridge        Component Value Flag Ref Range Units Status    ISTAT Sodium 141      136 - 145 mmol/L Final    ISTAT BUN 17      7 - 18 mg/dL Final    ISTAT Potassium 4.4      3.6 - 5.1 mmol/L Final    ISTAT Chloride 104      98 - 112 mmol/L Final    ISTAT Ionized Calcium 1.16      1.12 - 1.32 mmol/L Final    ISTAT Hematocrit 44      34 - 50 % Final    ISTAT Glucose 102      70 - 99 mg/dL Final    ISTAT TCO2 25      21 - 32 mmol/L Final    ISTAT Creatinine 1.10      0.55 - 1.02 mg/dL Final    Comment:    This test may exhibit falsely elevated results when a sample is collected from a patient who is presently taking Hydroxyurea. If patient is consuming Hydroxyurea, i-STAT creatinine analysis should be considered inaccurate and a sample should be referred to the Central Lab for analysis, if clinically indicated.    eGFR-Cr 55      >=60 mL/min/1.73m2 Final                  ISTAT Troponin        Component Value Flag Ref Range Units Status    ISTAT Troponin <0.02      <0.045 ng/mL ng/mL Final                   EKG: NSR, rate 89, No ST/T wave changes, compared to 03/2024.    MDM      Medical Decision Making  65 yo female with PMH of HTN, essential tremor, diverticulitis presents with complaint of cough, low grade fever (tmax 100), chills, and occasional shortness of breath and palpitations that began 24 hours ago.   Pt's sister recently passed away.     Diff dx includes Influenza, Covid, pneumonia, arrhythmia, takotsubo cardiomyopathy, electrolyte abnormality, anemia, CHF.   On exam, pt is no acute distress, slightly elevated BP, lungs clear bilaterally, no abdominal ttp, no peripheral edema.     EKG reveals NSR, no ST/T wave changes to suggest ischemia in clinic.     Troponin negative, D Dimer negative. I-STAT mostly unremarkable; (GFR has improved since Sep), CBC unremarkable.      CXR reveals an enlarged  cardiac shadow without signs of CHF, pneumonia or other acute process. Discussed findings with pt and spouse.     Will send Mucinex, Flonase, and Tessalon for likely viral syndrome. Steam, rest, fluids.   Covid and Influenza negative.      Discussed follow up with PCP tomorrow for follow up on CXR findings (enlarged cardiac shadow) explained she may need an echo.  ED precautions emphasized for any worsening, or new/concerning symptoms.   Pt and spouse agreeable to plan.       Discussed with Dr Catrachito Dee: ED physician        Disposition and Plan     Clinical Impression:  1. Viral illness    2. Cough    3. Dyspnea, unspecified type         Disposition:  Discharge  3/9/2025  3:45 pm    Follow-up:  Tani Calderon MD  [address] [place] [id number] [phone]    Call in 1 day  for follow up          Medications Prescribed:  Discharge Medication List as of 3/9/2025  3:45 PM              Supplementary Documentation:

## 2025-03-09 NOTE — ED INITIAL ASSESSMENT (HPI)
Pt with c/o cough that started yesterday.  Pt states symptoms got worse during the night.  Pt with c/o chest discomfort with coughing.  Pt states chills and low grade fever at home.

## 2025-03-11 ENCOUNTER — OFFICE VISIT (OUTPATIENT)
Dept: INTERNAL MEDICINE CLINIC | Facility: CLINIC | Age: 65
End: 2025-03-11
Payer: COMMERCIAL

## 2025-03-11 VITALS
TEMPERATURE: 97 F | RESPIRATION RATE: 18 BRPM | SYSTOLIC BLOOD PRESSURE: 135 MMHG | HEART RATE: 92 BPM | HEIGHT: 62 IN | DIASTOLIC BLOOD PRESSURE: 85 MMHG | BODY MASS INDEX: 34.04 KG/M2 | OXYGEN SATURATION: 98 % | WEIGHT: 185 LBS

## 2025-03-11 DIAGNOSIS — H92.02 LEFT EAR PAIN: ICD-10-CM

## 2025-03-11 DIAGNOSIS — R05.1 ACUTE COUGH: ICD-10-CM

## 2025-03-11 DIAGNOSIS — M94.0 COSTOCHONDRITIS: ICD-10-CM

## 2025-03-11 DIAGNOSIS — R93.89 ABNORMAL CHEST X-RAY: ICD-10-CM

## 2025-03-11 DIAGNOSIS — H61.22 IMPACTED CERUMEN OF LEFT EAR: ICD-10-CM

## 2025-03-11 DIAGNOSIS — J22 ACUTE LOWER RESPIRATORY INFECTION: Primary | ICD-10-CM

## 2025-03-11 PROCEDURE — 3075F SYST BP GE 130 - 139MM HG: CPT

## 2025-03-11 PROCEDURE — 3008F BODY MASS INDEX DOCD: CPT

## 2025-03-11 PROCEDURE — G2211 COMPLEX E/M VISIT ADD ON: HCPCS

## 2025-03-11 PROCEDURE — 3079F DIAST BP 80-89 MM HG: CPT

## 2025-03-11 PROCEDURE — 1170F FXNL STATUS ASSESSED: CPT

## 2025-03-11 PROCEDURE — 99214 OFFICE O/P EST MOD 30 MIN: CPT

## 2025-03-11 PROCEDURE — 1160F RVW MEDS BY RX/DR IN RCRD: CPT

## 2025-03-11 PROCEDURE — 1159F MED LIST DOCD IN RCRD: CPT

## 2025-03-11 RX ORDER — PREDNISONE 20 MG/1
40 TABLET ORAL DAILY
Qty: 14 TABLET | Refills: 0 | Status: SHIPPED | OUTPATIENT
Start: 2025-03-11 | End: 2025-03-18

## 2025-03-11 RX ORDER — ALBUTEROL SULFATE 90 UG/1
2 INHALANT RESPIRATORY (INHALATION) EVERY 6 HOURS PRN
Qty: 1 EACH | Refills: 0 | Status: SHIPPED | OUTPATIENT
Start: 2025-03-11

## 2025-03-11 RX ORDER — ACETAMINOPHEN AND CODEINE PHOSPHATE 120; 12 MG/5ML; MG/5ML
5 SOLUTION ORAL NIGHTLY PRN
Qty: 35 ML | Refills: 0 | Status: SHIPPED | OUTPATIENT
Start: 2025-03-11 | End: 2025-03-18

## 2025-03-11 RX ORDER — AZITHROMYCIN 250 MG/1
TABLET, FILM COATED ORAL
Qty: 6 TABLET | Refills: 0 | Status: SHIPPED | OUTPATIENT
Start: 2025-03-11 | End: 2025-03-16

## 2025-03-11 NOTE — PROGRESS NOTES
Susie Schulz is a 64 year old female.  HPI:   Chest Congestion  This is a new problem. Episode onset: x 4 days. The problem occurs intermittently. The problem has been gradually improving. Associated symptoms include arthralgias, coughing, a fever and a sore throat. Pertinent negatives include no abdominal pain, anorexia, change in bowel habit, chest pain, chills, congestion, diaphoresis, fatigue, headaches, joint swelling, myalgias, nausea, neck pain, numbness, rash, vertigo, vomiting or weakness. Nothing aggravates the symptoms. She has tried rest and sleep (guafenasin and tessalon perles) for the symptoms. The treatment provided no relief.   Ear Pain   There is pain in the left ear. This is a new problem. Episode onset: x 4 days. The problem occurs constantly. The problem has been unchanged. The pain is moderate. Associated symptoms include coughing and a sore throat. Pertinent negatives include no abdominal pain, ear discharge, headaches, hearing loss, neck pain, rash, rhinorrhea or vomiting. She has tried nothing for the symptoms.      Pt states when she coughs she experiences an extreme sharp pain in the left side of the chest closer to her sternum. Cough is worse at night and hindering sleep.  Pt was exposed to pseudomonas pneumonia 10 days ago.   Pt was evaluated two days ago at ED for the above complains. Negative for flu and Covid. Chest xray negative for CHF, pneumonia, effusion, edema, or other acute process, but it did show cardiac shadow enlargement. Troponin and D-dimer was negative. Discharged with Benzonatate and Guaifenesin.   Home Covid test negative yesterday.   Current Outpatient Medications   Medication Sig Dispense Refill    albuterol 108 (90 Base) MCG/ACT Inhalation Aero Soln Inhale 2 puffs into the lungs every 6 (six) hours as needed. 1 each 0    predniSONE 20 MG Oral Tab Take 2 tablets (40 mg total) by mouth daily for 7 days. 14 tablet 0    azithromycin (ZITHROMAX Z-MAE) 250 MG Oral Tab  Take 2 tablets (500 mg total) by mouth daily for 1 day, THEN 1 tablet (250 mg total) daily for 4 days. 6 tablet 0    acetaminophen-codeine 120-12 MG/5ML Oral Solution Take 5 mL by mouth nightly as needed for Pain. 35 mL 0    guaiFENesin ER (MUCINEX) 600 MG Oral Tablet 12 Hr Take 2 tablets (1,200 mg total) by mouth 2 (two) times daily for 5 days. 20 tablet 0    benzonatate 100 MG Oral Cap Take 1 capsule (100 mg total) by mouth 3 (three) times daily as needed for cough. 30 capsule 0    lisinopril 10 MG Oral Tab Take 1 tablet (10 mg total) by mouth daily. 90 tablet 3    atorvastatin 20 MG Oral Tab Take 1 tablet (20 mg total) by mouth nightly. 90 tablet 3    phenazopyridine 200 MG Oral Tab Take 1 tablet (200 mg total) by mouth 3 (three) times daily as needed for Pain. 30 tablet 1    chlorhexidine gluconate 0.12 % Mouth/Throat Solution FLOSS AND BRUSH TEETH THEN SWISH ACCORDING TO PACKAGING INSTRUCTION      estradiol (ESTRACE) 0.1 MG/GM Vaginal Cream Apply 1/2 gram vaginally 2 times per week. 42.5 g 3    Cholecalciferol (VITAMIN D-3) 25 MCG (1000 UT) Oral Cap       Melatonin 10 MG Oral Tab nightly.      Multiple Minerals-Vitamins (CALCIUM CITRATE PLUS/MAGNESIUM) Oral Tab       Levocetirizine Dihydrochloride 5 MG Oral Tab Take 1 tablet (5 mg total) by mouth every evening. Alternates with Allegra      Multiple Vitamin (MULTI-VITAMIN DAILY) Oral Tab Take by mouth daily.        Past Medical History:    Abdominal pain    Occasional    Arrhythmia    First degree AV Block    Back pain    Bx Sx X 3    Back problem    Bad breath    Occasional    Constipation    Since childhood    Diverticulitis    Diverticulosis    Essential hypertension    Flatulence/gas pain/belching    Since childhood    Fracture    Rt shoulder    Headache disorder    Migraines since childhood, very rare since menopause    High blood pressure    High cholesterol    Controlled with statin    Hx of adenomatous colonic polyps    Hyperlipidemia    Irritable bowel  syndrome    Osteoarthritis    Uncomfortable fullness after meals    Occasional    Visual impairment    Wears glasses    Glasses      Social History:  Social History     Socioeconomic History    Marital status:    Tobacco Use    Smoking status: Never    Smokeless tobacco: Never   Vaping Use    Vaping status: Never Used   Substance and Sexual Activity    Alcohol use: Yes     Alcohol/week: 0.0 standard drinks of alcohol     Comment: Rare    Drug use: Never     Social Drivers of Health     Food Insecurity: No Food Insecurity (9/16/2020)    [facility]    Hunger Vital Sign     Worried About Running Out of Food in the Last Year: Never true     Ran Out of Food in the Last Year: Never true   Transportation Needs: No Transportation Needs (9/16/2020)    [facility] from [facility], [facility]    PRAPARE - Transportation     Lack of Transportation (Medical): No     Lack of Transportation (Non-Medical): No        REVIEW OF SYSTEMS:   Review of Systems   Constitutional:  Positive for fever. Negative for chills, diaphoresis and fatigue.   HENT:  Positive for ear pain and sore throat. Negative for congestion, ear discharge, hearing loss, postnasal drip, rhinorrhea, sinus pressure, sinus pain and trouble swallowing.    Eyes: Negative.    Respiratory:  Positive for cough. Negative for shortness of breath, wheezing and stridor.    Cardiovascular:  Negative for chest pain, palpitations and leg swelling.   Gastrointestinal:  Negative for abdominal pain, anorexia, change in bowel habit, nausea and vomiting.   Musculoskeletal:  Positive for arthralgias. Negative for joint swelling, myalgias, neck pain and neck stiffness.   Skin:  Negative for rash.   Neurological: Negative.  Negative for vertigo, weakness, numbness and headaches.   Psychiatric/Behavioral: Negative.           EXAM:   /85   Pulse 92   Temp 97.3 °F  (36.3 °C) (Temporal)   Resp 18   Ht 5' 2\" (1.575 m)   Wt 185 lb (83.9 kg)   SpO2 98%   BMI 33.84 kg/m²   Physical Exam  Vitals and nursing note reviewed.   Constitutional:       Appearance: Normal appearance. She is normal weight.   HENT:      Right Ear: A middle ear effusion is present. Tympanic membrane is not injected or erythematous.      Left Ear: Tenderness present. There is impacted cerumen.   Cardiovascular:      Rate and Rhythm: Normal rate and regular rhythm.      Pulses: Normal pulses.      Heart sounds: Normal heart sounds.   Pulmonary:      Effort: Pulmonary effort is normal. No respiratory distress.      Breath sounds: Normal breath sounds. No stridor. No wheezing, rhonchi or rales.   Chest:      Chest wall: No tenderness.          Comments: Tender area with palpation  Musculoskeletal:         General: Tenderness present.   Skin:     General: Skin is warm and dry.      Capillary Refill: Capillary refill takes less than 2 seconds.   Neurological:      General: No focal deficit present.      Mental Status: She is alert and oriented to person, place, and time. Mental status is at baseline.   Psychiatric:         Mood and Affect: Mood normal.         Behavior: Behavior normal.         Thought Content: Thought content normal.         Judgment: Judgment normal.          ASSESSMENT AND PLAN:   Diagnoses and all orders for this visit:    Acute lower respiratory infection  -     predniSONE 20 MG Oral Tab; Take 2 tablets (40 mg total) by mouth daily for 7 days.  -     azithromycin (ZITHROMAX Z-MAE) 250 MG Oral Tab; Take 2 tablets (500 mg total) by mouth daily for 1 day, THEN 1 tablet (250 mg total) daily for 4 days.    Acute cough  -     albuterol 108 (90 Base) MCG/ACT Inhalation Aero Soln; Inhale 2 puffs into the lungs every 6 (six) hours as needed.  -     predniSONE 20 MG Oral Tab; Take 2 tablets (40 mg total) by mouth daily for 7 days.  -     acetaminophen-codeine 120-12 MG/5ML Oral Solution; Take 5 mL  by mouth nightly as needed for Pain.   -continue with Mucinex.   Costochondritis  -     predniSONE 20 MG Oral Tab; Take 2 tablets (40 mg total) by mouth daily for 7 days.    Impacted cerumen of left ear   -cerumen is deep in the ear canal. Due to pain will hold off on irrigation today. Instructed to use Debrox gtts and return in 1 week  Left ear pain   -unable to visualize TM due to cerumen impaction. Will cover with abx for possible acute otitis media.   Abnormal chest x-ray  -     CARD ECHO 2D DOPPLER (CPT=93306); Future      Requested Prescriptions     Signed Prescriptions Disp Refills    albuterol 108 (90 Base) MCG/ACT Inhalation Aero Soln 1 each 0     Sig: Inhale 2 puffs into the lungs every 6 (six) hours as needed.    predniSONE 20 MG Oral Tab 14 tablet 0     Sig: Take 2 tablets (40 mg total) by mouth daily for 7 days.    azithromycin (ZITHROMAX Z-MAE) 250 MG Oral Tab 6 tablet 0     Sig: Take 2 tablets (500 mg total) by mouth daily for 1 day, THEN 1 tablet (250 mg total) daily for 4 days.    acetaminophen-codeine 120-12 MG/5ML Oral Solution 35 mL 0     Sig: Take 5 mL by mouth nightly as needed for Pain.         The patient indicates understanding of these issues and agrees to the plan.  The patient is asked to return if symptoms persist or worsen.

## 2025-03-17 ENCOUNTER — HOSPITAL ENCOUNTER (OUTPATIENT)
Dept: CV DIAGNOSTICS | Facility: HOSPITAL | Age: 65
Discharge: HOME | End: 2025-03-17
Payer: MEDICARE

## 2025-03-17 DIAGNOSIS — R93.89 ABNORMAL CHEST X-RAY: ICD-10-CM

## 2025-03-17 PROCEDURE — 93306 TTE W/DOPPLER COMPLETE: CPT

## 2025-06-07 DIAGNOSIS — N95.2 POSTMENOPAUSAL ATROPHIC VAGINITIS: ICD-10-CM

## 2025-06-09 RX ORDER — ESTRADIOL 0.1 MG/G
0.5 CREAM VAGINAL
Qty: 42.5 G | Refills: 0 | Status: SHIPPED | OUTPATIENT
Start: 2025-06-09

## 2025-06-17 ENCOUNTER — TELEPHONE (OUTPATIENT)
Dept: UROLOGY | Facility: CLINIC | Age: 65
End: 2025-06-17

## 2025-06-24 ENCOUNTER — OFFICE VISIT (OUTPATIENT)
Dept: UROLOGY | Facility: CLINIC | Age: 65
End: 2025-06-24
Attending: OBSTETRICS & GYNECOLOGY
Payer: MEDICARE

## 2025-06-24 VITALS — BODY MASS INDEX: 34.01 KG/M2 | WEIGHT: 184.81 LBS | TEMPERATURE: 98 F | HEIGHT: 62 IN

## 2025-06-24 DIAGNOSIS — N39.3 STRESS INCONTINENCE, FEMALE: ICD-10-CM

## 2025-06-24 DIAGNOSIS — N39.41 URGE INCONTINENCE: ICD-10-CM

## 2025-06-24 DIAGNOSIS — R30.0 DYSURIA: ICD-10-CM

## 2025-06-24 DIAGNOSIS — N32.81 DETRUSOR INSTABILITY: ICD-10-CM

## 2025-06-24 DIAGNOSIS — N95.2 POSTMENOPAUSAL ATROPHIC VAGINITIS: Primary | ICD-10-CM

## 2025-06-24 PROCEDURE — 99212 OFFICE O/P EST SF 10 MIN: CPT

## 2025-06-24 RX ORDER — ESTRADIOL 0.1 MG/G
0.5 CREAM VAGINAL
Qty: 42.5 G | Refills: 3 | Status: SHIPPED | OUTPATIENT
Start: 2025-06-24

## 2025-06-24 RX ORDER — PHENAZOPYRIDINE HYDROCHLORIDE 200 MG/1
200 TABLET, FILM COATED ORAL 3 TIMES DAILY PRN
Qty: 30 TABLET | Refills: 1 | Status: SHIPPED | OUTPATIENT
Start: 2025-06-24

## 2025-06-24 NOTE — PROGRESS NOTES
Patient presents to follow up UTIs    She is currently using vag estrogen    Using pyridium prn  Last UTI 2024  No s/sx of UTI  Happy    Bowels reg    She reports ovearll improvement   Happy    Not bothered by bulge  Occasional UI, worse after URI  Did PT in past, does exercises    No vag bleeding    Temp 98.2 °F (36.8 °C)   Ht 62\"   Wt 184 lb 12.8 oz (83.8 kg)   BMI 33.80 kg/m²     GEN: NAD  CV: RRR  Pulm: nl effort  Abd: soft    PELVIC EXAM:  Ext. Gen: some atrophy, no lesions  Urethra: +atrophy, nontender  Bladder:some fullness, nontender  Vagina: ++atrophy  Cervix: no bleeding, no lesions, nontender  Uterus: +mobile  Adnexa:no masses, nontender  Perineum: nontender  Anus: wnl  Rectum: defer     PELVIS FLOOR NEUROMUSCULAR FUNCTION:  Strength:  1 and Unable to hold greater than 3 sec  Perineal Sensation:  Normal        PELVIC SUPPORT:  [place]:  0  Ant:  0  Post:  2  CST:  negative  UVJ: somewhat hypermobile    Impression/Plan:    ICD-10-CM    1. Postmenopausal atrophic vaginitis  N95.2 estradiol 0.1 MG/GM Vaginal Cream      2. 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 Urge incontinence  N39.41       4. Stress incontinence, female  N39.3       5. Dysuria  R30.0 phenazopyridine 200 MG Oral Tab          Discussion Items:   Discussed mgmt of vulvovaginal atrophy with vaginal estrogen cream. Reviewed associated benefits, risks, alternatives, and goals. Recommend low dose twice weekly mgmt      Bowel management reviewed. Discussed using fiber daily w/ addition of miralax as needed     Cont pelvic exercises, may resume PFPT prn  Call with s/sx of UTI     All questions answered  She understands and agrees to plan    All questions answered  She understands and agrees to plan    Return in about 1 year (around 6/24/2026) for sooner prn.    Josette Tavares, DO, FACOG, FACS    The 21st Century Cures Act makes medical notes like these available to patients in the interest of transparency. However, be advised this is a medical  document. It is intended as peer to peer communication. It is written in medical language and may contain abbreviations or verbiage that are unfamiliar. It may appear blunt or direct. Medical documents are intended to carry relevant information, facts as evident, and the clinical opinion of the practitioner.

## (undated) DIAGNOSIS — Z02.9 ENCOUNTERS FOR UNSPECIFIED ADMINISTRATIVE PURPOSE: ICD-10-CM

## (undated) DIAGNOSIS — Z11.52 ENCOUNTER FOR SCREENING FOR COVID-19: Primary | ICD-10-CM

## (undated) DIAGNOSIS — K57.92 DIVERTICULITIS: ICD-10-CM

## (undated) DEVICE — LIGHT HANDLE

## (undated) DEVICE — VIOLET BRAIDED (POLYGLACTIN 910), SYNTHETIC ABSORBABLE SUTURE: Brand: COATED VICRYL

## (undated) DEVICE — SEAL

## (undated) DEVICE — CADIERE FORCEPS: Brand: ENDOWRIST

## (undated) DEVICE — SPECIMEN SOCK - STANDARD: Brand: MEDI-VAC

## (undated) DEVICE — STERILE POLYISOPRENE POWDER-FREE SURGICAL GLOVES: Brand: PROTEXIS

## (undated) DEVICE — BAG DRAIN INFECTION CNTRL 2000

## (undated) DEVICE — DRAPE,TOP,102X53,STERILE: Brand: MEDLINE

## (undated) DEVICE — GYN CDS: Brand: MEDLINE INDUSTRIES, INC.

## (undated) DEVICE — CLOSING BUNDLE: Brand: MEDLINE INDUSTRIES, INC.

## (undated) DEVICE — TRAY FOLEY 16F IC URINMETER

## (undated) DEVICE — ARM DRAPE

## (undated) DEVICE — BLADELESS OBTURATOR: Brand: WECK VISTA

## (undated) DEVICE — GOWN,SIRUS,FABRIC-REINFORCED,X-LARGE: Brand: MEDLINE

## (undated) DEVICE — SUTURE ETHILON 2-0 FS

## (undated) DEVICE — TROCAR: Brand: KII SHIELDED BLADED ACCESS SYSTEM

## (undated) DEVICE — SUTURE PDS II 1 CTX

## (undated) DEVICE — COVER,MAYO STAND,STERILE: Brand: MEDLINE

## (undated) DEVICE — DRAPE EQUIPMENT INTRATEMP THER

## (undated) DEVICE — VESSEL SEALER EXTEND: Brand: ENDOWRIST

## (undated) DEVICE — MONOFILAMENT ABSORBABLE SUTURE: Brand: MAXON

## (undated) DEVICE — GLOVE SUR 6 SENSICARE PI PIP CRM PWD F

## (undated) DEVICE — STAPLER 60: Brand: SUREFORM

## (undated) DEVICE — STAPLER 60 RELOAD BLUE: Brand: SUREFORM

## (undated) DEVICE — BETADINE SOLUTION 8 OZ BTL

## (undated) DEVICE — STERILE SYNTHETIC POLYISOPRENE POWDER-FREE SURGICAL GLOVES WITH HYDROGEL COATING, SMOOTH FINISH, STRAIGHT FINGER: Brand: PROTEXIS

## (undated) DEVICE — HYSTEROSCOPIC OUTFLOW TUBE SET

## (undated) DEVICE — STAPLER CIRCULAR ENDO 29MM

## (undated) DEVICE — CANNULA SEAL

## (undated) DEVICE — SUTURE PROLENE 2-0 SH

## (undated) DEVICE — REDUCER: Brand: ENDOWRIST

## (undated) DEVICE — MEDI-VAC TUBING CONNECTOR 6-IN-1 "Y" POLYPROPYLENE: Brand: CARDINAL HEALTH

## (undated) DEVICE — MEDI-VAC NON-CONDUCTIVE SUCTION TUBING: Brand: CARDINAL HEALTH

## (undated) DEVICE — SOL  .9 3000ML

## (undated) DEVICE — SUTURE VLOC 90 3-0 9" 0644

## (undated) DEVICE — SCD SLEEVE KNEE HI BLEND

## (undated) DEVICE — ELITE HYSTEROSCOPE SEAL: Brand: TRUCLEAR

## (undated) DEVICE — Device

## (undated) DEVICE — DRAIN RELIAVAC 100CC

## (undated) DEVICE — SLEEVE COMPR M KNEE LEN SGL USE KENDALL SCD

## (undated) DEVICE — SIGMOIDOSCOPE LIGHTED BIOSEAL

## (undated) DEVICE — SUTURE VICRYL 0

## (undated) DEVICE — SUTURE VICRYL 5-0 PC-1

## (undated) DEVICE — SOLUTION IRRIG 1000ML 0.9% NACL USP BTL

## (undated) DEVICE — 40580 - THE PINK PAD - ADVANCED TRENDELENBURG POSITIONING KIT: Brand: 40580 - THE PINK PAD - ADVANCED TRENDELENBURG POSITIONING KIT

## (undated) DEVICE — VISUALIZATION SYSTEM: Brand: CLEARIFY

## (undated) DEVICE — TUBING CYSTO

## (undated) DEVICE — PERMANENT CAUTERY HOOK: Brand: ENDOWRIST

## (undated) DEVICE — PREMIUM WET SKIN PREP TRAY: Brand: MEDLINE INDUSTRIES, INC.

## (undated) DEVICE — SET TB INFLO FOR TRUCLEAR SYS HYSTEROLUX

## (undated) DEVICE — TIP-UP FENESTRATED GRASPER: Brand: ENDOWRIST

## (undated) DEVICE — TIBURON DRAPE TOWELS: Brand: CONVERTORS

## (undated) DEVICE — SENS GUIW URO 150CM NIT SS

## (undated) DEVICE — LAP COLON CDS: Brand: MEDLINE INDUSTRIES, INC.

## (undated) DEVICE — DRAIN CHANNEL 19FR BLAKE

## (undated) DEVICE — COLUMN DRAPE

## (undated) DEVICE — SOFT TISSUE SHAVER MINI: Brand: TRUCLEAR

## (undated) DEVICE — SPONGE STICK WITH PVP-I: Brand: KENDALL

## (undated) DEVICE — 3M™ STERI-STRIP™ REINFORCED ADHESIVE SKIN CLOSURES, R1547, 1/2 IN X 4 IN (12 MM X 100 MM), 6 STRIPS/ENVELOPE: Brand: 3M™ STERI-STRIP™

## (undated) DEVICE — SOLUTION IRRIG 3000ML 0.9% NACL FLX CONT

## (undated) DEVICE — 2000CC GUARDIAN II: Brand: GUARDIAN

## (undated) DEVICE — ENDOPATH ULTRA VERESS INSUFFLATION NEEDLES WITH LUER LOCK CONNECTORS: Brand: ENDOPATH

## (undated) NOTE — ED AVS SNAPSHOT
[facility] Emergency Department    [place]  [address] [address]    Phone:  [phone]    Fax:  [address] [facility]   MRN: [id number]    Department:  [facility] Emergency Department   Date of Visit:  4/1 IF THERE IS ANY CHANGE OR WORSENING OF YOUR CONDITION, CALL YOUR PRIMARY CARE PHYSICIAN AT ONCE OR RETURN IMMEDIATELY TO THE EMERGENCY DEPARTMENT.     If you have been prescribed any medication(s), please fill your prescription right away and begin taking t

## (undated) NOTE — LETTER
22    Patient: Christ Toscano  : 1960 Visit date: 2022    Dear  Guerrero Monreal MD    Thank you for referring Christ Toscano to my practice. Please find my assessment and plan below. Assessment   Diverticulitis  (primary encounter diagnosis)      Plan   This patient underwent a robotic low anterior resection of the rectosigmoid colon for diverticulitis on 2022. She remains with a Supa drain in place. It is draining minimally. This patient is doing extremely well. The patient has no nausea, vomiting, diarrhea, abdominal pain, or constipation. The patient has no complaints at the operative site. The patient has no incisional complaints. The patient denies fever or chills. Abdominal exam: Soft, nontender, nondistended, good bowel sounds. Incisions: All laparoscopic incisions are clean, dry, intact, without erythema, or cellulitis. The drain is serous material.  I took the opportunity today to remove the drain. I have no further follow-up scheduled with this patient at this time. This patient can see me on an as-needed basis. This patient should return urgently for any problems or complications related to my surgical intervention. I counseled patient on diet going forward, activities in the time of resumption, and all things regarding postoperative care. The should see me urgently for any problems in this postoperative time period.            Sincerely,       Abigail Oneill MD   CC: Noel Cheney MD

## (undated) NOTE — Clinical Note
Pt declined HFU for now. Pt is to see Dr. Darline See this week. Our discharge scheduling team is assisting with that appt. Medication list reviewed. Pt reports she is doing well. Thank you.

## (undated) NOTE — Clinical Note
Vanessa - I saw Amarilis today with urinary sx. I've recommended vag estrogen & bladder testing. I will work to manage her sx. I appreciate the opportunity to participate in her care. Thanks, Josette

## (undated) NOTE — LETTER
Patient Name: Susie Schulz  YOB: 1960          MRN :  [id number]  Date:  2024  Referring Physician:  Betty Musa    MUSCULOSKELETAL AND PELVIC FLOOR EVALUATION:     Diagnosis:     Incomplete bladder emptying (R33.9)  Pelvic muscle wasting (N81.84)  Female stress incontinence (N39.3)  Urge incontinence (N39.41)         Referring Provider: Betty Calderon  Date of Evaluation:    2024    Precautions:  None Next MD visit:   none scheduled  Date of Surgery: n/a     PATIENT SUMMARY   Susie Schulz is a 63 year old female  who presents to therapy today with complaints of leakage that recently increased after had bad cough.  Has had chronic bladder infections throughout the years and oftentimes has continued symptoms but negative cultures.  Current symptoms include: abdominal pain, vaginal pain, urgency/frequency, pressure, constipation, and leakage, back pain  Colon resection 2 years ago-bowel symptoms are worse in regards to constipation. Has to strain with bowel movements.Has a rectocele.    Pt describes pain level:pelvic: 0-2/10, back 0-5/10      Pregnant Now: No  Obstetrical/Gynecological history: : 3  Para: 3  Delivery method: vaginal  Occupation/Activities: retired, walking, back limiting  PFDI-20: 127.08/300    Susie describes prior level of function less leakage. Pt goals include no leakage-would like to not wear pad when going for a walk.  Past medical history was reviewed with Susie. Significant findings include  has a past medical history of Abdominal pain (Unknown), Arrhythmia, Back pain (´94), Back problem, Bad breath (Unknown), Constipation (Unknown), Diverticulitis (2015), Diverticulosis, Essential hypertension, Flatulence/gas pain/belching (Unknown), Fracture, Headache disorder (Unknown), High blood pressure, High cholesterol (Unknown, ~ 5 or 6 yrs), adenomatous colonic polyps (10/21/2021), Hyperlipidemia, Irritable bowel syndrome,  Osteoarthritis, Uncomfortable fullness after meals (Unknown), Visual impairment, and Wears glasses (Unknown).   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 Tavares -usu was voiding every 3-4 hours  Urine Stream: normal  Amount: min  Leaking occurs: coughing, running, walking  Episodes of Leakage: 1 times per day-variable   Amount of leakage: small  Pad use: yes-depends on what she's doing  Pad Change frequency: PRN  Nocturia: 1x-usu drink a lot before going to bed  Fluid Intake: water  Bladder irritants: 1 -1/2 caff  Post void dribble: no  Hovering: no  Empty bladder just in case: yes  Do you ever leak urine without knowing it? no    BOWEL HABITS  Types of symptoms: Constipation and Incomplete emptying -splinting  Frequency of bowel movements: since bowel resection, has small amounts of bowel almost every time pt urinates  Stool consistency: [place] Stool Scale: 5  Do you strain with defecation: Yes   Laxative use: Yes  benefiber    SEXUAL HEALTH STATUS  Some times has pelvic pain and lower abdominal pain-does not follow any pattern    ASSESSMENT  Susie presents to physical therapy evaluation with primary c/o urinary leakage. The results of the objective tests and measures show decreased pelvic floor muscle strength and coordination, fair bowel and bladder habits, decreased core/LE strength, lumbar tightness .  Functional deficits include but are not limited to embarrassment with leakage .  Signs and symptoms are consistent with diagnosis of  Incomplete bladder emptying (R33.9)  Pelvic muscle wasting (N81.84), Female stress incontinence (N39.3),  Urge incontinence (N39.41). Pt and PT discussed evaluation findings, pathology, POC and HEP.  Pt voiced understanding and performs HEP correctly without reported pain. Skilled Pelvic Physical Therapy is medically necessary to address the above impairments and reach functional goals.    OBJECTIVE:   Posture: increased lumbar lordosis  Pelvic Alignment: L sacral rotation  Gait: pt ambulates on level ground with normal mechanics.     External Palpation: B lumbar paraspinals with increased tension  Scars (location/surgery): healed -lumbar region, central  Abdominal Wall Integrity: no restrictions     Range Of Motion  Lumbar AROM screen: wnl-decreased segmental mobility  LE AROM screen: grossly WNL     Strength (MMT) 5/5 SHANTELLE LE except R hip/LE 4-/5 L 4/5  Transverse Abdominis: 1/5    Flexibility Summary: WNL SHANTELLE LE     Special Tests  SLR negative    Informed consent for internal pelvic evaluation given: Yes    External Observation:   Voluntary contraction: present   Voluntary relaxation: present  Involuntary contraction: absent  Involuntary relaxation: present    Mons pubis: WNL  Labia majora: WNL  Labia minora: WNL  Urethral meatus: WNL  Introitus: WNL  Perineal body: WNL    Sensory/Reflex:  Vestibule: normal bilaterally  Anal [place]: Not Tested    Internal Examination   Scar: none    Pelvic Floor Muscle strength: (PERF= Power/Endurance/Reps/Fast) MMT: 2/3/3/6  External Anal Sphincter: nt  Accessory Muscle Use: gluteals, adductors, abdominals    Tissue Laxity Test:  Anterior Wall: WNL  Posterior Wall: Min  Apical: WNL    Eccentric lengthening contraction: wnl  Bearing down Valsalva maneuver (2-3x): + rectocele    Internal Palpation: WNL     Today's Treatment and Response:   Patient education was provided on objective findings of external and internal evaluation and expectations with treatment outcomes. Educated on pelvic anatomy and function with diagrams and pelvic model, bladder normatives, adequate hydration levels,  proper toileting posture, instructed in bladder, bowel, and diet diary log and issued handout , stress/urge urinary incontinence strategies, coordination of diaphragmatic breathing and pelvic floor contraction , and knack/pelvic muscle brace    Manual therapy/NM Re-ed- internal retraining pelvic floor muscles to learn to perform diaphragmatic breathing , nikunj     Patient was instructed in and issued a HEP for: diet/bladder/bowel diary, diaphragmatic breathing x10, abdominal bracing x10, Kegel 10 repetitions 3x/day, 10 sec on/10 sec off, 2 sec on 2-4 sec off      Charges: PT Eval Moderate Complexity, NMR      Total Timed Treatment: 15 min     Total Treatment Time: 55 min     Based on clinical rationale and outcome measures, this evaluation involved Moderate Complexity decision making due to 1-2 personal factors/comorbidities, 3 body structures involved/activity limitations, and evolving symptoms including stress urinary incontinence, pelvic organ prolapse, and chronic back pain  PLAN OF CARE:    Goals: (to be met in 10 visits)  1. Independent in HEP and progression with improved understanding of bladder/bowel health, bladder retraining and long-term management.    2. Patient will demonstrate improved bladder voiding habits to voiding every 2 hours with less urinary leakage.  3. Patient will demonstrate improve PFM isolation, coordination and strength so she is able to reduce urinary urge and decrease leakage during ADL's.  4. Pt will have increased transverse abdominis muscle strength to 2/5 and hip strength to 4+/5 to assist with supporting pelvic floor muscles.   5. PFM contraction before increase intra-abdominal pressure.      Frequency / Duration: Patient will be seen for 1 x/week or a total of 10 visits over a 90 day period.  Treatment will include: Manual Therapy, Neuromuscular Re-education, Self-Care Home Management, Therapeutic Activities, Therapeutic Exercise, and Home Exercise Program instruction      Education or treatment limitation: None  Rehab Potential:good      Patient/Family/Caregiver was advised of these findings, precautions, and treatment options and has agreed to actively participate in planning and for this course of care.    Thank you for your referral. Please co-sign or sign and return this letter via fax as soon as possible to [phone]. If you have any questions, please contact me at Dept: [phone]    Sincerely,  Electronically signed by therapist: Faina Forrest PT  Physician's certification required: Yes  I certify the need for these services furnished under this plan of treatment and while under my care.    X___________________________________________________ Date____________________    Certification From: 4/22/2024  To:7/21/2024 21st Century Cures Act Notice to Patient: Medical documents like this are made available to patients in the interest of transparency. However, be advised this is a medical document and it is intended as ghmp-xo-glgy communication between your medical providers. This medical document may contain abbreviations, assessments, medical data, and results or other terms that are unfamiliar. Medical documents are intended to carry relevant information, facts as evident, and the clinical opinion of the practitioner. As such, this medical document may be written in language that appears blunt or direct. You are encouraged to contact your medical provider and/or [facility] Patient Experience if you have any questions about this medical document.

## (undated) NOTE — LETTER
OUTSIDE TESTING RESULT REQUEST     IMPORTANT: FOR YOUR IMMEDIATE ATTENTION  Please FAX all test results listed below to: [phone]     Testing already done on or about: asap     * * * * If testing is NOT complete, arrange with patient A.S.A.P. * * * *      Patient Name: Susie Schulz  Surgery Date: 3/7/2024  Medical Record: [id number]  CSN: [id number]  : 1960 - A: 63 y     Sex: female  Surgeon(s):  Josette Tavares DO  Procedure: HYSTEROSCOPY WITH DILATION AND CURETTAGE, POSSIBLE POLYPECTOMY  Anesthesia Type: Choice     Surgeon: Josette Tavares DO     The following Testing and Time Line are REQUIRED PER ANESTHESIA     EKG READ AND SIGNED WITHIN   90 days  CBC, Platelet; NO Differential within  30 days  BMP (requires 4 hour fast) within  90 days      Thank You,   Sent by:FABIAN

## (undated) NOTE — LETTER
22    Patient: Corine Guardado  : 1960 Visit date: 2022    Dear  Remington Diego MD    Thank you for referring Corine Guardado to my practice. Please find my assessment and plan below. Assessment   Diverticulitis  (primary encounter diagnosis)    Plan   This patient resents for further care and treatment of her acute diverticulitis. She is on the surgery schedule for a robotic low anterior resection the rectosigmoid colon. She still has some pain in the lower abdomen. Her pain is actually right greater than left, however her tenderness is left greater than right. The patient states that she has pain in anticipation of her bowel movements. When the stool gets into the left colon, that is when she has the most symptoms. All of her symptoms are much better than the last time I saw her. She is having bowel movements that are soft and irregular. She has no bright red blood per rectum. She has no nausea or vomiting. She has completed her antibiotic therapy without complication. Clinical exam reveals her abdomen be soft, slightly tender to deep palpation in the left lower quadrant. She is minimally tender on the right side, however that is her point of maximal symptoms. Bowel sounds have normal activity normal pitch. There are no incisions on the abdomen. Liver and spleen are not palpable. BMI is 32.77. We had a long conversation regarding the upcoming operation. I also asked her to please call me if her current pain and symptoms advanced to any degree. If she does call back, we will put her on antibiotics up until the day of surgery.       Sincerely,       Sean Garrido MD   CC: Olga Brown MD

## (undated) NOTE — ED AVS SNAPSHOT
[facility] Emergency Department    [facility]  [address] [address]    Phone:  [phone]    Fax:  [address] [facility]   MRN: [id number]    Department:  [facility] Emergency Department   Date of Visit:  4/1 from our patient liason soon after your visit. Also, some patients receive a detailed feedback survey mailed to them a week after the visit. If you receive this, we would really appreciate it if you could take the time to complete it. Thank you!       [address] [facility] [address] [facility] [id number] ([address]) [facility] [address] [facility]. ([address]) [facility]

## (undated) NOTE — LETTER
Date & Time: 8/4/2019, 5:06 PM  Patient: Anna Price  Encounter Provider(s):    Brennan Coleman MD       To Whom It May Concern:    Jerilyn Martin was seen and treated in our department on 8/4/2019. She should not return to work until 8/6/19.     If

## (undated) NOTE — LETTER
22    Patient: Dara Hinojosa  : 1960 Visit date: 3/14/2022    Dear  Heber Polanco MD    Thank you for referring Dara Hinojosa to my practice. Please find my assessment and plan below. Assessment   Diverticulitis  (primary encounter diagnosis)  Hx of adenomatous colonic polyps      Plan   I am seeing this patient in consultation from the primary care service and the gastroenterology group regarding multiple episodes of diverticulitis. Her last episode began on February 3, 2022. She took 10 days of antibiotics. She has had moderate relief, but not complete relief. Symptoms are still present at today's visit. She has had 4 different documented episodes of diverticulitis on CT scan in the past.    At this most recent episode her greatest symptoms were 5/10. It lasted several days at that level. She describes her pain in the both right and left lower quadrants. It was a combination of cramping, and intermittent stabbing pain. She did have a fever with this episode. She had nausea, but no vomiting. She has had constipation and bloating associated with her diverticulitis. She has no bright red blood per rectum or melena. She has no blood on the toilet paper. She has mucus in the stool, and narrowing of the stool at this time. She feels incomplete evacuation as well after bowel movement. The patient's last colonoscopy was in 2021. She has both diverticulosis and history of prior polyps. Her endoscopy is being done by Dr. Siri Noe. The patient has never been diagnosed with Crohn disease, ulcerative colitis, or irritable bowel syndrome. The patient has a family history of colon polyps in her father. No other first-degree or second-degree relatives have either colon cancer, or cancer of the uterus. She has not had any prior abdominal operations. The patient is not on any blood thinners.   The patient has never had heart attack, stroke, heart valve replacement, heart murmur, or cardiac arrhythmia. I have personally reviewed the CT scan myself and provided my own interpretation. I have reviewed past CT scans as well. There is a phlegmon and inflammatory process in the sigmoid colon that dips slightly down into the pelvis. It is immediately adjacent to the left ureter. On the most recent CT scan there is stranding. There is no evidence of free air or free fluid. Please see the radiologist findings for other incidental findings on the CT scan not related to the diverticulitis. Physical exam reveals her abdomen to be soft, tender in the left lower quadrant to deep palpation. No guarding or rebound. No masses. No ascites. No incisions. There are no inguinal or umbilical hernias present. Bowel sounds have normal activity and normal pitch. Liver and spleen are not palpable. She has persistent symptoms despite antibiotic therapy. There appears to be a smoldering diverticulitis at the site identified on CT scan. We will provide her with Augmentin 875 mg twice daily. This patient will ultimately require robotic low anterior resection the rectosigmoid colon for her multiply recurrent diverticulitis. I had a long conversation with the patient and her  regarding all the above.     She is scheduled for operation on April 13, 2022, at [facility].        Sincerely,       Kate Mcdonough MD   CC: Randy Spicer MD